# Patient Record
Sex: MALE | Race: WHITE | URBAN - METROPOLITAN AREA
[De-identification: names, ages, dates, MRNs, and addresses within clinical notes are randomized per-mention and may not be internally consistent; named-entity substitution may affect disease eponyms.]

---

## 2017-10-18 ENCOUNTER — APPOINTMENT (OUTPATIENT)
Age: 62
Setting detail: DERMATOLOGY
End: 2017-10-25

## 2017-10-18 VITALS
WEIGHT: 180 LBS | DIASTOLIC BLOOD PRESSURE: 72 MMHG | HEART RATE: 78 BPM | SYSTOLIC BLOOD PRESSURE: 121 MMHG | HEIGHT: 72 IN

## 2017-10-18 DIAGNOSIS — D69.2 OTHER NONTHROMBOCYTOPENIC PURPURA: ICD-10-CM

## 2017-10-18 DIAGNOSIS — L57.0 ACTINIC KERATOSIS: ICD-10-CM

## 2017-10-18 DIAGNOSIS — L81.0 POSTINFLAMMATORY HYPERPIGMENTATION: ICD-10-CM

## 2017-10-18 PROCEDURE — OTHER REASSURANCE: OTHER

## 2017-10-18 PROCEDURE — 99213 OFFICE O/P EST LOW 20 MIN: CPT | Mod: 25

## 2017-10-18 PROCEDURE — OTHER COUNSELING: OTHER

## 2017-10-18 PROCEDURE — 17000 DESTRUCT PREMALG LESION: CPT

## 2017-10-18 PROCEDURE — OTHER MIPS QUALITY: OTHER

## 2017-10-18 PROCEDURE — OTHER LIQUID NITROGEN: OTHER

## 2017-10-18 ASSESSMENT — TOTAL NUMBER OF LESIONS: # OF LESIONS?: 1

## 2017-10-18 ASSESSMENT — LOCATION DETAILED DESCRIPTION DERM
LOCATION DETAILED: LEFT SUPERIOR UPPER BACK
LOCATION DETAILED: RIGHT PROXIMAL DORSAL FOREARM
LOCATION DETAILED: RIGHT SUPERIOR UPPER BACK
LOCATION DETAILED: LEFT CENTRAL MALAR CHEEK

## 2017-10-18 ASSESSMENT — LOCATION SIMPLE DESCRIPTION DERM
LOCATION SIMPLE: LEFT CHEEK
LOCATION SIMPLE: LEFT UPPER BACK
LOCATION SIMPLE: RIGHT UPPER BACK
LOCATION SIMPLE: RIGHT FOREARM

## 2017-10-18 ASSESSMENT — LOCATION ZONE DERM
LOCATION ZONE: ARM
LOCATION ZONE: FACE
LOCATION ZONE: TRUNK

## 2017-10-18 ASSESSMENT — PAIN INTENSITY VAS: HOW INTENSE IS YOUR PAIN 0 BEING NO PAIN, 10 BEING THE MOST SEVERE PAIN POSSIBLE?: NO PAIN

## 2018-05-15 ENCOUNTER — APPOINTMENT (OUTPATIENT)
Age: 63
Setting detail: DERMATOLOGY
End: 2018-05-18

## 2018-05-15 VITALS
HEART RATE: 79 BPM | WEIGHT: 186 LBS | DIASTOLIC BLOOD PRESSURE: 69 MMHG | HEIGHT: 73 IN | SYSTOLIC BLOOD PRESSURE: 115 MMHG

## 2018-05-15 DIAGNOSIS — L57.0 ACTINIC KERATOSIS: ICD-10-CM

## 2018-05-15 DIAGNOSIS — D69.2 OTHER NONTHROMBOCYTOPENIC PURPURA: ICD-10-CM
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
## 2018-05-15 DIAGNOSIS — D89.811 CHRONIC GRAFT-VERSUS-HOST DISEASE: ICD-10-CM

## 2018-05-15 DIAGNOSIS — D18.0 HEMANGIOMA: ICD-10-CM
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
## 2018-05-15 PROBLEM — D18.01 HEMANGIOMA OF SKIN AND SUBCUTANEOUS TISSUE: Status: ACTIVE | Noted: 2018-05-15

## 2018-05-15 PROCEDURE — OTHER MIPS QUALITY: OTHER

## 2018-05-15 PROCEDURE — 99214 OFFICE O/P EST MOD 30 MIN: CPT | Mod: 25

## 2018-05-15 PROCEDURE — 17000 DESTRUCT PREMALG LESION: CPT

## 2018-05-15 PROCEDURE — OTHER COUNSELING: OTHER

## 2018-05-15 PROCEDURE — OTHER RECOMMENDATIONS: OTHER

## 2018-05-15 PROCEDURE — OTHER REASSURANCE: OTHER

## 2018-05-15 PROCEDURE — OTHER LIQUID NITROGEN: OTHER

## 2018-05-15 ASSESSMENT — LOCATION SIMPLE DESCRIPTION DERM
LOCATION SIMPLE: RIGHT UPPER BACK
LOCATION SIMPLE: LEFT NOSE
LOCATION SIMPLE: RIGHT UPPER ARM
LOCATION SIMPLE: CHEST
LOCATION SIMPLE: LEFT UPPER BACK
LOCATION SIMPLE: LEFT FOREARM
LOCATION SIMPLE: RIGHT FOREARM

## 2018-05-15 ASSESSMENT — LOCATION DETAILED DESCRIPTION DERM
LOCATION DETAILED: RIGHT SUPERIOR UPPER BACK
LOCATION DETAILED: LEFT NASAL SIDEWALL
LOCATION DETAILED: RIGHT DISTAL DORSAL FOREARM
LOCATION DETAILED: LEFT PROXIMAL DORSAL FOREARM
LOCATION DETAILED: RIGHT MID-UPPER BACK
LOCATION DETAILED: RIGHT PROXIMAL POSTERIOR UPPER ARM
LOCATION DETAILED: LEFT SUPERIOR UPPER BACK
LOCATION DETAILED: RIGHT MEDIAL INFERIOR CHEST
LOCATION DETAILED: RIGHT PROXIMAL DORSAL FOREARM

## 2018-05-15 ASSESSMENT — LOCATION ZONE DERM
LOCATION ZONE: NOSE
LOCATION ZONE: TRUNK
LOCATION ZONE: ARM

## 2018-05-15 ASSESSMENT — PAIN INTENSITY VAS: HOW INTENSE IS YOUR PAIN 0 BEING NO PAIN, 10 BEING THE MOST SEVERE PAIN POSSIBLE?: NO PAIN

## 2018-05-15 ASSESSMENT — TOTAL NUMBER OF LESIONS: # OF LESIONS?: 1

## 2018-05-15 ASSESSMENT — BSA RASH: BSA RASH: 5

## 2018-05-15 ASSESSMENT — SEVERITY ASSESSMENT: SEVERITY: MILD

## 2018-05-15 NOTE — PROCEDURE: RECOMMENDATIONS
Recommendation Preamble: The following recommendations were made during the visit:
Recommendations (Free Text): Highly advised to wear sunscreen with SPF 50 when in direct sunlight to reduce risk of potential skin malignancies. Explained to patient that certain medications in his treatment regimen may make him more photosensitive, leading to burns and high risk of skin cancer development.
Detail Level: Generalized

## 2018-11-28 ENCOUNTER — APPOINTMENT (OUTPATIENT)
Age: 63
Setting detail: DERMATOLOGY
End: 2018-12-02

## 2018-11-28 VITALS
HEIGHT: 72.5 IN | WEIGHT: 189 LBS | SYSTOLIC BLOOD PRESSURE: 141 MMHG | HEART RATE: 78 BPM | DIASTOLIC BLOOD PRESSURE: 80 MMHG

## 2018-11-28 DIAGNOSIS — L81.4 OTHER MELANIN HYPERPIGMENTATION: ICD-10-CM

## 2018-11-28 DIAGNOSIS — L81.0 POSTINFLAMMATORY HYPERPIGMENTATION: ICD-10-CM

## 2018-11-28 DIAGNOSIS — D22 MELANOCYTIC NEVI: ICD-10-CM

## 2018-11-28 DIAGNOSIS — D89.811 CHRONIC GRAFT-VERSUS-HOST DISEASE: ICD-10-CM

## 2018-11-28 PROBLEM — D22.5 MELANOCYTIC NEVI OF TRUNK: Status: ACTIVE | Noted: 2018-11-28

## 2018-11-28 PROCEDURE — OTHER REASSURANCE: OTHER

## 2018-11-28 PROCEDURE — OTHER PATIENT SPECIFIC COUNSELING: OTHER

## 2018-11-28 PROCEDURE — 99214 OFFICE O/P EST MOD 30 MIN: CPT

## 2018-11-28 PROCEDURE — OTHER MIPS QUALITY: OTHER

## 2018-11-28 PROCEDURE — OTHER COUNSELING: OTHER

## 2018-11-28 ASSESSMENT — LOCATION DETAILED DESCRIPTION DERM
LOCATION DETAILED: LEFT POSTERIOR SHOULDER
LOCATION DETAILED: PERIUMBILICAL SKIN
LOCATION DETAILED: RIGHT POSTERIOR SHOULDER
LOCATION DETAILED: LEFT MEDIAL INFERIOR CHEST
LOCATION DETAILED: RIGHT SUPERIOR MEDIAL MIDBACK
LOCATION DETAILED: RIGHT MEDIAL UPPER BACK
LOCATION DETAILED: RIGHT MEDIAL SUPERIOR CHEST
LOCATION DETAILED: RIGHT SUPERIOR LATERAL MIDBACK
LOCATION DETAILED: LEFT SUPERIOR UPPER BACK

## 2018-11-28 ASSESSMENT — SEVERITY ASSESSMENT: SEVERITY: MILD

## 2018-11-28 ASSESSMENT — LOCATION ZONE DERM
LOCATION ZONE: ARM
LOCATION ZONE: TRUNK

## 2018-11-28 ASSESSMENT — LOCATION SIMPLE DESCRIPTION DERM
LOCATION SIMPLE: LEFT UPPER BACK
LOCATION SIMPLE: RIGHT LOWER BACK
LOCATION SIMPLE: RIGHT SHOULDER
LOCATION SIMPLE: RIGHT UPPER BACK
LOCATION SIMPLE: LEFT SHOULDER
LOCATION SIMPLE: ABDOMEN
LOCATION SIMPLE: CHEST

## 2018-11-28 ASSESSMENT — PAIN INTENSITY VAS: HOW INTENSE IS YOUR PAIN 0 BEING NO PAIN, 10 BEING THE MOST SEVERE PAIN POSSIBLE?: NO PAIN

## 2018-11-28 NOTE — PROCEDURE: PATIENT SPECIFIC COUNSELING
Detail Level: Simple
Reassured the patient that condition is not currently active with residual hyper pigmentation.

## 2019-10-01 NOTE — PROCEDURE: LIQUID NITROGEN
Number Of Freeze-Thaw Cycles: 2 freeze-thaw cycles
Render Post-Care Instructions In Note?: no
Post-Care Instructions: I reviewed with the patient in detail post-care instructions. Patient is to wear sunprotection, and avoid picking at any of the treated lesions. Pt may apply Vaseline to crusted or scabbing areas.
Detail Level: Detailed
Duration Of Freeze Thaw-Cycle (Seconds): 5
Consent: The patient's consent was obtained including but not limited to risks of crusting, scabbing, blistering, scarring, darker or lighter pigmentary change, recurrence, incomplete removal and infection.
n/a

## 2020-06-30 ENCOUNTER — APPOINTMENT (OUTPATIENT)
Dept: URBAN - METROPOLITAN AREA CLINIC 232 | Age: 65
Setting detail: DERMATOLOGY
End: 2020-06-30

## 2020-06-30 DIAGNOSIS — L81.4 OTHER MELANIN HYPERPIGMENTATION: ICD-10-CM

## 2020-06-30 DIAGNOSIS — L57.0 ACTINIC KERATOSIS: ICD-10-CM

## 2020-06-30 DIAGNOSIS — L82.1 OTHER SEBORRHEIC KERATOSIS: ICD-10-CM

## 2020-06-30 DIAGNOSIS — D18.0 HEMANGIOMA: ICD-10-CM

## 2020-06-30 DIAGNOSIS — D22 MELANOCYTIC NEVI: ICD-10-CM

## 2020-06-30 PROBLEM — D22.5 MELANOCYTIC NEVI OF TRUNK: Status: ACTIVE | Noted: 2020-06-30

## 2020-06-30 PROBLEM — D18.01 HEMANGIOMA OF SKIN AND SUBCUTANEOUS TISSUE: Status: ACTIVE | Noted: 2020-06-30

## 2020-06-30 PROCEDURE — 17000 DESTRUCT PREMALG LESION: CPT

## 2020-06-30 PROCEDURE — 99213 OFFICE O/P EST LOW 20 MIN: CPT | Mod: 25

## 2020-06-30 PROCEDURE — 17003 DESTRUCT PREMALG LES 2-14: CPT

## 2020-06-30 PROCEDURE — OTHER LIQUID NITROGEN: OTHER

## 2020-06-30 PROCEDURE — OTHER COUNSELING: OTHER

## 2020-06-30 ASSESSMENT — LOCATION SIMPLE DESCRIPTION DERM
LOCATION SIMPLE: LEFT CHEEK
LOCATION SIMPLE: RIGHT FOREARM
LOCATION SIMPLE: RIGHT UPPER BACK
LOCATION SIMPLE: SCALP

## 2020-06-30 ASSESSMENT — LOCATION ZONE DERM
LOCATION ZONE: ARM
LOCATION ZONE: TRUNK
LOCATION ZONE: FACE
LOCATION ZONE: SCALP

## 2020-06-30 ASSESSMENT — LOCATION DETAILED DESCRIPTION DERM
LOCATION DETAILED: RIGHT MID-UPPER BACK
LOCATION DETAILED: RIGHT SUPERIOR FRONTAL SCALP
LOCATION DETAILED: RIGHT LATERAL UPPER BACK
LOCATION DETAILED: LEFT CENTRAL MALAR CHEEK
LOCATION DETAILED: RIGHT SUPERIOR LATERAL UPPER BACK
LOCATION DETAILED: RIGHT PROXIMAL ULNAR DORSAL FOREARM

## 2020-06-30 NOTE — PROCEDURE: LIQUID NITROGEN
Aperture Size (Optional): C
Render Note In Bullet Format When Appropriate: No
Post-Care Instructions: I reviewed with the patient in detail post-care instructions. Patient is to wear sunprotection, and avoid picking at any of the treated lesions. Pt may apply Vaseline to crusted or scabbing areas.
Detail Level: Detailed
Number Of Freeze-Thaw Cycles: 2 freeze-thaw cycles
Duration Of Freeze Thaw-Cycle (Seconds): 5
Consent: The patient's consent was obtained including but not limited to risks of crusting, scabbing, blistering, scarring, darker or lighter pigmentary change, recurrence, incomplete removal and infection.
posterior

## 2020-11-25 ENCOUNTER — APPOINTMENT (OUTPATIENT)
Dept: URBAN - METROPOLITAN AREA CLINIC 232 | Age: 65
Setting detail: DERMATOLOGY
End: 2020-11-25

## 2020-11-25 PROBLEM — D48.5 NEOPLASM OF UNCERTAIN BEHAVIOR OF SKIN: Status: ACTIVE | Noted: 2020-11-25

## 2020-11-25 PROCEDURE — 11102 TANGNTL BX SKIN SINGLE LES: CPT

## 2020-11-25 PROCEDURE — OTHER BIOPSY BY SHAVE METHOD: OTHER

## 2020-11-25 NOTE — PROCEDURE: BIOPSY BY SHAVE METHOD
Anesthesia Type: 1% lidocaine with epinephrine and a 1:10 solution of 8.4% sodium bicarbonate
Consent: Written consent was obtained and risks were reviewed including but not limited to scarring, infection, bleeding, scabbing, incomplete removal, nerve damage and allergy to anesthesia.
Curettage Text: The wound bed was treated with curettage after the biopsy was performed.
Post-Care Instructions: I reviewed with the patient in detail post-care instructions. Patient is to keep the biopsy site dry overnight, and then apply vaseline or aquaphor healing ointment twice daily until healed. Patient may apply hydrogen peroxide soaks to remove any crusting.
Anesthesia Volume In Cc (Will Not Render If 0): 0.5
Validate Lesion Size: No
Dressing: bandage
Detail Level: Detailed
Electrodesiccation Text: The wound bed was treated with electrodesiccation after the biopsy was performed.
Type Of Destruction Used: Curettage
Additional Anesthesia Volume In Cc (Will Not Render If 0): 0
Electrodesiccation And Curettage Text: The wound bed was treated with electrodesiccation and curettage after the biopsy was performed.
Notification Instructions: Patient will be notified of biopsy results. However, patient instructed to call the office if not contacted within 2 weeks.
Depth Of Biopsy: dermis
Wound Care: Petrolatum
Billing Type: Third-Party Bill
Silver Nitrate Text: The wound bed was treated with silver nitrate after the biopsy was performed.
Hemostasis: Electrocautery
Was A Bandage Applied: Yes
Biopsy Type: H and E
Biopsy Method: Dermablade
Information: Selecting Yes will display possible errors in your note based on the variables you have selected. This validation is only offered as a suggestion for you. PLEASE NOTE THAT THE VALIDATION TEXT WILL BE REMOVED WHEN YOU FINALIZE YOUR NOTE. IF YOU WANT TO FAX A PRELIMINARY NOTE YOU WILL NEED TO TOGGLE THIS TO 'NO' IF YOU DO NOT WANT IT IN YOUR FAXED NOTE.
Cryotherapy Text: The wound bed was treated with cryotherapy after the biopsy was performed.

## 2021-01-19 ENCOUNTER — APPOINTMENT (OUTPATIENT)
Dept: URBAN - METROPOLITAN AREA CLINIC 232 | Age: 66
Setting detail: DERMATOLOGY
End: 2021-01-19

## 2021-01-19 PROBLEM — C44.311 BASAL CELL CARCINOMA OF SKIN OF NOSE: Status: ACTIVE | Noted: 2021-01-19

## 2021-01-19 PROCEDURE — OTHER COUNSELING: OTHER

## 2021-01-19 PROCEDURE — OTHER TREATMENT REGIMEN: OTHER

## 2021-01-19 PROCEDURE — 99213 OFFICE O/P EST LOW 20 MIN: CPT

## 2021-01-19 NOTE — PROCEDURE: TREATMENT REGIMEN
Detail Level: Zone
Initiate Treatment: Patient was explained his options for MOHS surgery and radiation treatment, he has chosen to do radiation. Was given information on the radiation treatment.  All the risks and benefits were discussed (potentially decreased cure rate or reoccurrence) and questions answered.  He really does not want surgery on his nose.

## 2021-02-08 ENCOUNTER — APPOINTMENT (OUTPATIENT)
Dept: URBAN - METROPOLITAN AREA CLINIC 289 | Age: 66
Setting detail: DERMATOLOGY
End: 2021-02-08

## 2021-02-08 PROBLEM — C44.311 BASAL CELL CARCINOMA OF SKIN OF NOSE: Status: ACTIVE | Noted: 2021-02-08

## 2021-02-08 PROCEDURE — 77280 THER RAD SIMULAJ FIELD SMPL: CPT

## 2021-02-08 PROCEDURE — OTHER SUPERFICIAL RADIATION TREATMENT: OTHER

## 2021-02-08 PROCEDURE — 77300 RADIATION THERAPY DOSE PLAN: CPT

## 2021-02-08 PROCEDURE — 99213 OFFICE O/P EST LOW 20 MIN: CPT | Mod: 25

## 2021-02-08 PROCEDURE — OTHER TREATMENT REGIMEN: OTHER

## 2021-02-08 PROCEDURE — G6001 ECHO GUIDANCE RADIOTHERAPY: HCPCS

## 2021-02-08 PROCEDURE — OTHER FOLLOW UP FOR NEXT VISIT: OTHER

## 2021-02-08 PROCEDURE — 77334 RADIATION TREATMENT AID(S): CPT

## 2021-02-08 PROCEDURE — 77261 THER RADIOLOGY TX PLNG SMPL: CPT

## 2021-02-08 NOTE — PROCEDURE: SUPERFICIAL RADIATION TREATMENT
Render Patient Eligibility And Selection In Note?: Yes
Prescription Used: 1
Custom Shielding Afterword Text Will Not Be Included With Simple Simulations (X X Y Cm............): port to correlate with the lesion size, including treatment margin. The custom lead shield is adequate to accommodate the appropriate applicator and provide adequate shielding around the treatment site. Additional shielding (as noted below) is used to protect sensitive, normal tissues.
Include Rx 3 When Rendering Additional Prescriptions: No
Total Dose (Optional-Please Include Units): 5555.40 cGy
Computed Treatment Time In Min (Will Render The Same As Calculated Treatment Time If Left Blank): 0.47
Additional Prescription Justification Text: If there is any interruption in treatment exceeding 5 days please see Decay and Dose Adjustment Calculation and complete treatment under Prescription 2, 3 or 4 as appropriate.
Assessment: Appropriate reaction
Total Dose (Optional-Please Include Units) Rx 2: 2742.60 cGy
Energy (Include Units): 100 kV
Field Size (Applicator): 2.0 cm
Dimensions-X Axis In Cm: 0.8
Fractionation Number: 0
Total Number Of Fractions Rx 4: 15
Patient Positioning: Supine
Time Dose Fractionation (Optional- Include Units If Applicable): 99
Fractions / Week Rx 2: 3
Total Number Of Fractions Rx 2: 10
Functional Status: 0 (fully active)
Time Dose Fractionation (Optional- Include Units If Applicable) Rx 2: 49
Energy (Optional-Please Include Units) Rx 2: 70 kV
Initial Radiation Treatment Planning (Will Render If Bill Simulation = Yes): The patient had a complete consultation regarding all applicable modalities for the treatment of their skin cancer and based on a variety of factors including the type of tumor, size, and location, the relevant medical history as well as local tissue factors, the functional status of the individual, the ability to perform necessary postoperative wound instructions and the need for simultaneous treatments as well as overall wound healing status, it was determined that the patient would begin radiation therapy treatment for skin cancer.  A full simulation and treatment device design was performed including the determination and formulation of appropriate simple and complex devices including lead shield of 0.762 mm thickness to form molded customized shielding to specifically correlate with the lesion size including treatment margin.  The custom lead shield is adequate to accommodate the appropriate applicator and provide adequate shielding around the treatment site.  The specific field applicator, shields, and devices both simple and complex as well as the specific patient setup is outlined below.  The patient was given a full consent for superficial radiation to both verbally and in writing and the full determination of patient's eligibility for treatment and selection is outlined on the patient eligibility and treatment selection form.  The specific superficial radiotherapy prescription was determined and was documented on the superficial radiotherapy prescription form.  A treatment calculation was also performed and documented on the treatment calculation form.  Based on the prescription, the patient was scheduled for a series of fractional treatments.
Custom Shielding Preamble Text Will Not Be Included With Simple Simulations (.......... X X Y Cm): A lead shield of 0.762 mm thickness is utilized to form a molded, custom shield with a
Dose Per Fractionation In Cgy (Optional): 277.77
Depth (Optional-Please Include Units): 2.02 mm
Total Number Of Fractions: 20
Simple Simulation Preamble Text Will Be Included With Simple Simulations (.......... Indications): Simple simulation was performed today for the following reasons:
Daily Fractionated Dose (Optional- Include Units) Rx 2: 274.26 cGy
Treatment Margins In Cm: 0.6
Shielding Size (Optional- Include Units) Rx 2: 2.2 x 3.3 cm
Intro Statement (Will Not Render If Left Blank): The patient has undergone superficial radiation therapy for skin cancer and presents for post SRT evaluation and management. Per protocol and as documented on the flow sheet, the patient was questioned as to subjective redness, pruritus, pain, drainage, fatigue, or any other symptoms. Objectively, the radiation area was evaluated with regards to erythema, atrophy, scale, crusting, erosion, ulceration, edema, purpura, tenderness, warmth, drainage, and any other findings. The plan was extensively reviewed including the dose, and dosing schedule. The simulation and clinical setup was also reviewed as was the external and any internal shields and based on this review the appropriateness and sufficiency of treatment was determined.
Treatment Time / Fractionation (Optional- Include Units): 0.47 min
Treatment Device Design After Initial Simulation Justification (Will Render If Bill For Treatment Devices = Yes): The patient is status post radiation simulation and is evaluated as to the use of additional devices for shielding and placement for radiation therapy.
Fractionation Number (Evaluation): 21
Detail Level: Detailed
Information: Selecting Yes will display possible errors in your note based on the variables you have selected. This validation is only offered as a suggestion for you. PLEASE NOTE THAT THE VALIDATION TEXT WILL BE REMOVED WHEN YOU FINALIZE YOUR NOTE. IF YOU WANT TO FAX A PRELIMINARY NOTE YOU WILL NEED TO TOGGLE THIS TO 'NO' IF YOU DO NOT WANT IT IN YOUR FAXED NOTE.
Treatment Time / Fractionation (Optional- Include Units) Rx 2: 0.42 min
Fractions / Week Rx 4: 5
Dimensions-Y Axis In Cm: 0.7
Treatment Time In Min (Optional): 0.94
Daily Fractionated Dose (Optional- Include Units): 277.77 cGy
Dose / Tx In Cgy (Optional): 602.54
Field Size (Applicator) Rx 2: 4.0 cm

## 2021-02-08 NOTE — PROCEDURE: TREATMENT REGIMEN
Samples Given: Miaderm & Aquaphor
Detail Level: Detailed
Initiate Treatment: SRT
Plan: Per the request of Dr. Cano, patient, Silverio Campo, was seen today for Superficial Radiation Therapy requiring simulation in preparation for treatment of specific diseased site(s). Simulation is necessary to determine correct patient and treatment portal positioning, deliver safe and effective radiation therapy. A high frequency ultrasound image was acquired today for two-dimensional evaluation of the tumor volume, in addition to geometric accuracy of field placement. US depth is 2.02 mm. Physician evaluation of the ultrasound tumor depth will be ongoing throughout the course of treatment and is deemed medically necessary in order to ensure the efficacy of treatment. Additionally, the use of visualization and targeted assessment allows the patient to be able to see their cancer(s) progress, encouraging patient to complete full course of radiotherapy.\\nPer the request of Dr. Cano, continuing medical physics review as per radiotherapy standard of care post every 5th fraction for patient, including assessment of treatment parameters,  of dose delivery, and review of patient treatment documentation in support of the provider, is ordered, ensuring efficacy and continued safe delivery of radiotherapy. Included in physics check is review of patient setup information, all pertinent simulation and treatment photographs checks, prescription, dose calculation verification, daily dose charted correctly, elapsed days and treatment days correctly charted, cumulative dose correct, and review of any prescription changes. Continued medical physics review post every 5th fraction of therapy is requested by provider for appropriate radiotherapy management, and is deemed medically necessary and standard of care.

## 2021-02-09 ENCOUNTER — APPOINTMENT (OUTPATIENT)
Dept: URBAN - METROPOLITAN AREA CLINIC 289 | Age: 66
Setting detail: DERMATOLOGY
End: 2021-02-09

## 2021-02-09 PROBLEM — C44.311 BASAL CELL CARCINOMA OF SKIN OF NOSE: Status: ACTIVE | Noted: 2021-02-09

## 2021-02-09 PROCEDURE — 77401 RADIATION TX DELIVERY SUPFC: CPT

## 2021-02-09 PROCEDURE — G6001 ECHO GUIDANCE RADIOTHERAPY: HCPCS

## 2021-02-09 PROCEDURE — 77280 THER RAD SIMULAJ FIELD SMPL: CPT

## 2021-02-09 PROCEDURE — OTHER FOLLOW UP FOR NEXT VISIT: OTHER

## 2021-02-09 PROCEDURE — OTHER TREATMENT REGIMEN: OTHER

## 2021-02-09 PROCEDURE — OTHER SUPERFICIAL RADIATION TREATMENT: OTHER

## 2021-02-09 NOTE — PROCEDURE: TREATMENT REGIMEN
Detail Level: Detailed
Plan: This patient has been treated today with image guided superficial radiation therapy for non-melanoma skin cancer.\\n\\nWritten informed consent has been previously obtained from this patient for this treatment. This consent is documented in the patient’s chart. The patient gave verbal consent to continue treatment today.\\n\\nThe patient was treated with a specific radiation dose and setup as prescribed by the provider listed on this visit note. A Radiation Therapist performed administration of radiation under supervision of provider. The treatment parameters and cumulative dose are indicated above.\\n\\nPrior to administering the radiation, the patient underwent a verification therapeutic radiology simulation-aided field setting defining relevant normal and abnormal target anatomy and acquiring images with high frequency ultrasound in addition to data necessary developing optimal radiation treatment process for the patient. This process includes verification of the treatment port(s) and proper treatment positioning. All treatment ports were photographed within electronic medical record. The patient’s customized lead blocking along with gross tumor volume and margin was confirmed. Considering superficial radiotherapy is clinical in setup, this requires physician and radiation therapist to clarify location interest being treated against initial images, pathology and patient anatomy. Care was taken ensuring fields treated were geometrically accurate and properly positioned using therapeutic radiology simulation-aided field setting verification per fraction. This process is also utilized to determine if any prescription or setup changes are necessary. These steps are therefore medically necessary ensuring safe and effective administration of radiation. Ongoing therapeutic radiology simulation-aided field setting verification is ordered throughout course of therapy.\\n\\nA high frequency ultrasound image was acquired today for two-dimensional evaluation of the tumor volume and response to treatment, in addition to geometric accuracy of field placement. US depth is 2.03 mm, which is 0.01 mm in difference from previous imaging. The field placement and ultrasound imaging, per fraction, is separate and distinct from the initial simulation, and is an important task in providing safe administration of superficial radiation therapy. Physician evaluation of the ultrasound tumor depth will be ongoing throughout the course of treatment and is deemed medically necessary in order to ensure the efficacy of treatment and any necessary changes. Today’s image was evaluated for determination of continuation of treatment with the current plan or with necessary changes as appropriate. According to provider review of verification, therapeutic radiology simulation-aided field setting and imaging, no change is required. Additionally the use of ultrasound visualization and targeted assessment allows the patient to be able to see their cancer(s) progress, encouraging patient to complete and maintain compliance through full course of radiotherapy.\\n\\nPer Dr. Cano, continued ultrasound guidance and therapeutic radiology simulation-aided field setting verification per fraction is required for field placement, measurement of tumor depth, progress and acute effect monitoring.

## 2021-02-09 NOTE — PROCEDURE: SUPERFICIAL RADIATION TREATMENT
Render Prescriptions In Note?: No
Dimensions-Y Axis In Cm: 0.7
Energy (Optional-Please Include Units): 100 kV
Number Of Days Off Treatment: 1
Simple Simulation Preamble Text Will Be Included With Simple Simulations (.......... Indications): Simple simulation was performed today for the following reasons:
Information: Selecting Yes will display possible errors in your note based on the variables you have selected. This validation is only offered as a suggestion for you. PLEASE NOTE THAT THE VALIDATION TEXT WILL BE REMOVED WHEN YOU FINALIZE YOUR NOTE. IF YOU WANT TO FAX A PRELIMINARY NOTE YOU WILL NEED TO TOGGLE THIS TO 'NO' IF YOU DO NOT WANT IT IN YOUR FAXED NOTE.
Total Number Of Fractions Rx 4: 15
Time Dose Fractionation (Optional- Include Units If Applicable) Rx 2: 49
Total Dose (Optional-Please Include Units): 5555.40 cGy
Daily Fractionated Dose (Optional- Include Units) Rx 2: 274.26 cGy
Field Size (Applicator): 2.0 cm
Depth (Optional-Please Include Units): 2.02 mm
Simple Simulation Afterword Text Will Be Included With Simple Simulations (Indications............): The patient had a complete consultation regarding all applicable modalities for the treatment of their skin cancer and based on a variety of factors including the type of tumor, size, and location, the relevant medical history as well as local tissue factors, the functional status of the individual, the ability to perform necessary postoperative wound instructions and the need for simultaneous treatments as well as overall wound healing status, it was determined that the patient would begin radiation therapy treatment for skin cancer.  A full simulation and treatment device design was performed including the determination and formulation of appropriate simple and complex devices including lead shield of 0.762 mm thickness to form molded customized shielding to specifically correlate with the lesion size including treatment margin.  The custom lead shield is adequate to accommodate the appropriate applicator and provide adequate shielding around the treatment site.  The specific field applicator, shields, and devices both simple and complex as well as the specific patient setup is outlined below.  The patient was given a full consent for superficial radiation to both verbally and in writing and the full determination of patient's eligibility for treatment and selection is outlined on the patient eligibility and treatment selection form.  The specific superficial radiotherapy prescription was determined and was documented on the superficial radiotherapy prescription form.  A treatment calculation was also performed and documented on the treatment calculation form.  Based on the prescription, the patient was scheduled for a series of fractional treatments.
Total Number Of Fractions: 20
Custom Shielding Preamble Text Will Not Be Included With Simple Simulations (.......... X X Y Cm): A lead shield of 0.762 mm thickness is utilized to form a molded, custom shield with a
Bill For Radiation Treatment: Yes
Assessment: Appropriate reaction
Computed Treatment Time In Min (Will Render The Same As Calculated Treatment Time If Left Blank): 0.47
Daily Fractionated Dose (Optional- Include Units): 277.77 cGy
Energy (Optional-Please Include Units) Rx 2: 70 kV
Treatment Device Design After Initial Simulation Justification (Will Render If Bill For Treatment Devices = Yes): The patient is status post radiation simulation and is evaluated as to the use of additional devices for shielding and placement for radiation therapy.
Dose Per Fractionation In Cgy (Optional): 277.77
Treatment Margins In Cm: 0.6
Detail Level: Detailed
Custom Shielding Afterword Text Will Not Be Included With Simple Simulations (X X Y Cm............): port to correlate with the lesion size, including treatment margin. The custom lead shield is adequate to accommodate the appropriate applicator and provide adequate shielding around the treatment site. Additional shielding (as noted below) is used to protect sensitive, normal tissues.
Port Dimensions-X Axis In Cm: 0
Total Dose (Optional-Please Include Units) Rx 2: 2742.60 cGy
Treatment Time / Fractionation (Optional- Include Units): 0.47 min
Time Dose Fractionation (Optional- Include Units If Applicable): 99
Total Number Of Fractions Rx 2: 10
Functional Status: 0 (fully active)
Dimensions-X Axis In Cm: 0.8
Fractions / Week Rx 3: 5
Fractions / Week Rx 2: 3
Patient Positioning: Supine
Day Of The Week Treatment Administered: Tuesday
Intro Statement (Will Not Render If Left Blank): The patient has undergone superficial radiation therapy for skin cancer and presents for post SRT evaluation and management. Per protocol and as documented on the flow sheet, the patient was questioned as to subjective redness, pruritus, pain, drainage, fatigue, or any other symptoms. Objectively, the radiation area was evaluated with regards to erythema, atrophy, scale, crusting, erosion, ulceration, edema, purpura, tenderness, warmth, drainage, and any other findings. The plan was extensively reviewed including the dose, and dosing schedule. The simulation and clinical setup was also reviewed as was the external and any internal shields and based on this review the appropriateness and sufficiency of treatment was determined.
Additional Prescription Justification Text: If there is any interruption in treatment exceeding 5 days please see Decay and Dose Adjustment Calculation and complete treatment under Prescription 2, 3 or 4 as appropriate.
Treatment Time / Fractionation (Optional- Include Units) Rx 2: 0.42 min
Field Size (Applicator) Rx 2: 4.0 cm
Fractionation Number (Evaluation): 21
Shielding Size (Optional- Include Units) Rx 2: 2.2 x 3.3 cm

## 2021-02-10 ENCOUNTER — APPOINTMENT (OUTPATIENT)
Dept: URBAN - METROPOLITAN AREA CLINIC 289 | Age: 66
Setting detail: DERMATOLOGY
End: 2021-02-10

## 2021-02-10 PROBLEM — C44.311 BASAL CELL CARCINOMA OF SKIN OF NOSE: Status: ACTIVE | Noted: 2021-02-10

## 2021-02-10 PROCEDURE — OTHER TREATMENT REGIMEN: OTHER

## 2021-02-10 PROCEDURE — G6001 ECHO GUIDANCE RADIOTHERAPY: HCPCS

## 2021-02-10 PROCEDURE — OTHER SUPERFICIAL RADIATION TREATMENT: OTHER

## 2021-02-10 PROCEDURE — 77401 RADIATION TX DELIVERY SUPFC: CPT

## 2021-02-10 PROCEDURE — OTHER FOLLOW UP FOR NEXT VISIT: OTHER

## 2021-02-10 PROCEDURE — 77280 THER RAD SIMULAJ FIELD SMPL: CPT

## 2021-02-10 NOTE — PROCEDURE: TREATMENT REGIMEN

## 2021-02-10 NOTE — PROCEDURE: SUPERFICIAL RADIATION TREATMENT
Total Number Of Fractions Rx 3: 15
Custom Shielding Afterword Text Will Not Be Included With Simple Simulations (X X Y Cm............): port to correlate with the lesion size, including treatment margin. The custom lead shield is adequate to accommodate the appropriate applicator and provide adequate shielding around the treatment site. Additional shielding (as noted below) is used to protect sensitive, normal tissues.
Assessment: Appropriate reaction
Total Dose (Optional-Please Include Units): 5555.40 cGy
Fractions / Week Rx 4: 5
Field Size (Applicator): 2.0 cm
Agustin For Simulation Without Treatment Device Design (Simple Simulation): No
Field Number: 1
Treatment Time / Fractionation (Optional- Include Units) Rx 2: 0.42 min
Fractions / Week: 3
Fractionation Number (Evaluation): 21
Treatment Time In Min (Optional): 0.47
Treatment Time / Fractionation (Optional- Include Units): 0.47 min
Fractionation Number: 2
Energy (Optional-Please Include Units): 100 kV
Bill For Radiation Treatment: Yes
Simple Simulation Afterword Text Will Be Included With Simple Simulations (Indications............): The patient had a complete consultation regarding all applicable modalities for the treatment of their skin cancer and based on a variety of factors including the type of tumor, size, and location, the relevant medical history as well as local tissue factors, the functional status of the individual, the ability to perform necessary postoperative wound instructions and the need for simultaneous treatments as well as overall wound healing status, it was determined that the patient would begin radiation therapy treatment for skin cancer.  A full simulation and treatment device design was performed including the determination and formulation of appropriate simple and complex devices including lead shield of 0.762 mm thickness to form molded customized shielding to specifically correlate with the lesion size including treatment margin.  The custom lead shield is adequate to accommodate the appropriate applicator and provide adequate shielding around the treatment site.  The specific field applicator, shields, and devices both simple and complex as well as the specific patient setup is outlined below.  The patient was given a full consent for superficial radiation to both verbally and in writing and the full determination of patient's eligibility for treatment and selection is outlined on the patient eligibility and treatment selection form.  The specific superficial radiotherapy prescription was determined and was documented on the superficial radiotherapy prescription form.  A treatment calculation was also performed and documented on the treatment calculation form.  Based on the prescription, the patient was scheduled for a series of fractional treatments.
Patient Positioning: Supine
Day Of The Week Treatment Administered: Wednesday
Dimensions-X Axis In Cm: 0.8
Intro Statement (Will Not Render If Left Blank): The patient has undergone superficial radiation therapy for skin cancer and presents for post SRT evaluation and management. Per protocol and as documented on the flow sheet, the patient was questioned as to subjective redness, pruritus, pain, drainage, fatigue, or any other symptoms. Objectively, the radiation area was evaluated with regards to erythema, atrophy, scale, crusting, erosion, ulceration, edema, purpura, tenderness, warmth, drainage, and any other findings. The plan was extensively reviewed including the dose, and dosing schedule. The simulation and clinical setup was also reviewed as was the external and any internal shields and based on this review the appropriateness and sufficiency of treatment was determined.
Field Size (Applicator) Rx 2: 4.0 cm
Port Dimensions-X Axis In Cm: 0
Information: Selecting Yes will display possible errors in your note based on the variables you have selected. This validation is only offered as a suggestion for you. PLEASE NOTE THAT THE VALIDATION TEXT WILL BE REMOVED WHEN YOU FINALIZE YOUR NOTE. IF YOU WANT TO FAX A PRELIMINARY NOTE YOU WILL NEED TO TOGGLE THIS TO 'NO' IF YOU DO NOT WANT IT IN YOUR FAXED NOTE.
Custom Shielding Preamble Text Will Not Be Included With Simple Simulations (.......... X X Y Cm): A lead shield of 0.762 mm thickness is utilized to form a molded, custom shield with a
Total Number Of Fractions Rx 2: 10
Shielding Size (Optional- Include Units) Rx 2: 2.2 x 3.3 cm
Time Dose Fractionation (Optional- Include Units If Applicable): 99
Detail Level: Detailed
Dose / Tx In Cgy (Optional): 277.77
Total Number Of Fractions: 20
Dimensions-Y Axis In Cm: 0.7
Treatment Device Design After Initial Simulation Justification (Will Render If Bill For Treatment Devices = Yes): The patient is status post radiation simulation and is evaluated as to the use of additional devices for shielding and placement for radiation therapy.
Depth (Optional-Please Include Units): 2.02 mm
Time Dose Fractionation (Optional- Include Units If Applicable) Rx 2: 49
Total Dose (Optional-Please Include Units) Rx 2: 2742.60 cGy
Daily Fractionated Dose (Optional- Include Units): 277.77 cGy
Cumulative Dose In Cgy (Optional): 555.54
Simple Simulation Preamble Text Will Be Included With Simple Simulations (.......... Indications): Simple simulation was performed today for the following reasons:
Treatment Margins In Cm: 0.6
Additional Prescription Justification Text: If there is any interruption in treatment exceeding 5 days please see Decay and Dose Adjustment Calculation and complete treatment under Prescription 2, 3 or 4 as appropriate.
Energy (Optional-Please Include Units) Rx 2: 70 kV
Daily Fractionated Dose (Optional- Include Units) Rx 2: 274.26 cGy
Functional Status: 0 (fully active)

## 2021-02-11 ENCOUNTER — APPOINTMENT (OUTPATIENT)
Dept: URBAN - METROPOLITAN AREA CLINIC 289 | Age: 66
Setting detail: DERMATOLOGY
End: 2021-02-11

## 2021-02-11 PROBLEM — C44.311 BASAL CELL CARCINOMA OF SKIN OF NOSE: Status: ACTIVE | Noted: 2021-02-11

## 2021-02-11 PROCEDURE — OTHER FOLLOW UP FOR NEXT VISIT: OTHER

## 2021-02-11 PROCEDURE — OTHER SUPERFICIAL RADIATION TREATMENT: OTHER

## 2021-02-11 PROCEDURE — G6001 ECHO GUIDANCE RADIOTHERAPY: HCPCS

## 2021-02-11 PROCEDURE — 77401 RADIATION TX DELIVERY SUPFC: CPT

## 2021-02-11 PROCEDURE — OTHER TREATMENT REGIMEN: OTHER

## 2021-02-11 PROCEDURE — 77280 THER RAD SIMULAJ FIELD SMPL: CPT

## 2021-02-11 NOTE — PROCEDURE: SUPERFICIAL RADIATION TREATMENT
Render Additional Prescriptions In Note?: No
Field Number: 1
Fractions / Week Rx 3: 5
Dose / Tx In Cgy (Optional): 277.77
Total Dose (Optional-Please Include Units) Rx 2: 2742.60 cGy
Time Dose Fractionation (Optional- Include Units If Applicable): 99
Cumulative Dose In Cgy (Optional): 833.31
Field Size (Applicator): 2.0 cm
Initial Radiation Treatment Planning (Will Render If Bill Simulation = Yes): The patient had a complete consultation regarding all applicable modalities for the treatment of their skin cancer and based on a variety of factors including the type of tumor, size, and location, the relevant medical history as well as local tissue factors, the functional status of the individual, the ability to perform necessary postoperative wound instructions and the need for simultaneous treatments as well as overall wound healing status, it was determined that the patient would begin radiation therapy treatment for skin cancer.  A full simulation and treatment device design was performed including the determination and formulation of appropriate simple and complex devices including lead shield of 0.762 mm thickness to form molded customized shielding to specifically correlate with the lesion size including treatment margin.  The custom lead shield is adequate to accommodate the appropriate applicator and provide adequate shielding around the treatment site.  The specific field applicator, shields, and devices both simple and complex as well as the specific patient setup is outlined below.  The patient was given a full consent for superficial radiation to both verbally and in writing and the full determination of patient's eligibility for treatment and selection is outlined on the patient eligibility and treatment selection form.  The specific superficial radiotherapy prescription was determined and was documented on the superficial radiotherapy prescription form.  A treatment calculation was also performed and documented on the treatment calculation form.  Based on the prescription, the patient was scheduled for a series of fractional treatments.
Total Number Of Fractions Rx 3: 15
Dimensions-Y Axis In Cm: 0.7
Patient Positioning: Supine
Port Dimensions-X Axis In Cm: 0
Daily Fractionated Dose (Optional- Include Units) Rx 2: 274.26 cGy
Total Number Of Fractions: 20
Energy (Optional-Please Include Units) Rx 2: 70 kV
Depth (Optional-Please Include Units): 2.02 mm
Validate Note Data (See Information Below): Yes
Dimensions-X Axis In Cm: 0.8
Computed Treatment Time In Min (Will Render The Same As Calculated Treatment Time If Left Blank): 0.47
Treatment Margins In Cm: 0.6
Additional Prescription Justification Text: If there is any interruption in treatment exceeding 5 days please see Decay and Dose Adjustment Calculation and complete treatment under Prescription 2, 3 or 4 as appropriate.
Detail Level: Detailed
Fractionation Number (Evaluation): 21
Assessment: Appropriate reaction
Treatment Time / Fractionation (Optional- Include Units) Rx 2: 0.42 min
Fractionation Number: 3
Field Size (Applicator) Rx 2: 4.0 cm
Daily Fractionated Dose (Optional- Include Units): 277.77 cGy
Simple Simulation Preamble Text Will Be Included With Simple Simulations (.......... Indications): Simple simulation was performed today for the following reasons:
Energy (Include Units): 100 kV
Total Dose (Optional-Please Include Units): 5555.40 cGy
Custom Shielding Preamble Text Will Not Be Included With Simple Simulations (.......... X X Y Cm): A lead shield of 0.762 mm thickness is utilized to form a molded, custom shield with a
Treatment Time / Fractionation (Optional- Include Units): 0.47 min
Information: Selecting Yes will display possible errors in your note based on the variables you have selected. This validation is only offered as a suggestion for you. PLEASE NOTE THAT THE VALIDATION TEXT WILL BE REMOVED WHEN YOU FINALIZE YOUR NOTE. IF YOU WANT TO FAX A PRELIMINARY NOTE YOU WILL NEED TO TOGGLE THIS TO 'NO' IF YOU DO NOT WANT IT IN YOUR FAXED NOTE.
Day Of The Week Treatment Administered: Thursday
Total Number Of Fractions Rx 2: 10
Intro Statement (Will Not Render If Left Blank): The patient has undergone superficial radiation therapy for skin cancer and presents for post SRT evaluation and management. Per protocol and as documented on the flow sheet, the patient was questioned as to subjective redness, pruritus, pain, drainage, fatigue, or any other symptoms. Objectively, the radiation area was evaluated with regards to erythema, atrophy, scale, crusting, erosion, ulceration, edema, purpura, tenderness, warmth, drainage, and any other findings. The plan was extensively reviewed including the dose, and dosing schedule. The simulation and clinical setup was also reviewed as was the external and any internal shields and based on this review the appropriateness and sufficiency of treatment was determined.
Shielding Size (Optional- Include Units) Rx 2: 2.2 x 3.3 cm
Custom Shielding Afterword Text Will Not Be Included With Simple Simulations (X X Y Cm............): port to correlate with the lesion size, including treatment margin. The custom lead shield is adequate to accommodate the appropriate applicator and provide adequate shielding around the treatment site. Additional shielding (as noted below) is used to protect sensitive, normal tissues.
Treatment Device Design After Initial Simulation Justification (Will Render If Bill For Treatment Devices = Yes): The patient is status post radiation simulation and is evaluated as to the use of additional devices for shielding and placement for radiation therapy.
Time Dose Fractionation (Optional- Include Units If Applicable) Rx 2: 49
Functional Status: 0 (fully active)

## 2021-02-11 NOTE — PROCEDURE: TREATMENT REGIMEN
Detail Level: Detailed
Plan: This patient has been treated today with image guided superficial radiation therapy for non-melanoma skin cancer.\\n\\nWritten informed consent has been previously obtained from this patient for this treatment. This consent is documented in the patient’s chart. The patient gave verbal consent to continue treatment today.\\n\\nThe patient was treated with a specific radiation dose and setup as prescribed by the provider listed on this visit note. A Radiation Therapist performed administration of radiation under supervision of provider. The treatment parameters and cumulative dose are indicated above.\\n\\nPrior to administering the radiation, the patient underwent a verification therapeutic radiology simulation-aided field setting defining relevant normal and abnormal target anatomy and acquiring images with high frequency ultrasound in addition to data necessary developing optimal radiation treatment process for the patient. This process includes verification of the treatment port(s) and proper treatment positioning. All treatment ports were photographed within electronic medical record. The patient’s customized lead blocking along with gross tumor volume and margin was confirmed. Considering superficial radiotherapy is clinical in setup, this requires physician and radiation therapist to clarify location interest being treated against initial images, pathology and patient anatomy. Care was taken ensuring fields treated were geometrically accurate and properly positioned using therapeutic radiology simulation-aided field setting verification per fraction. This process is also utilized to determine if any prescription or setup changes are necessary. These steps are therefore medically necessary ensuring safe and effective administration of radiation. Ongoing therapeutic radiology simulation-aided field setting verification is ordered throughout course of therapy.\\n\\nA high frequency ultrasound image was acquired today for two-dimensional evaluation of the tumor volume and response to treatment, in addition to geometric accuracy of field placement. US depth is 1.95 mm, which is 0.04 mm in difference from previous imaging. The field placement and ultrasound imaging, per fraction, is separate and distinct from the initial simulation, and is an important task in providing safe administration of superficial radiation therapy. Physician evaluation of the ultrasound tumor depth will be ongoing throughout the course of treatment and is deemed medically necessary in order to ensure the efficacy of treatment and any necessary changes. Today’s image was evaluated for determination of continuation of treatment with the current plan or with necessary changes as appropriate. According to provider review of verification, therapeutic radiology simulation-aided field setting and imaging, no change is required. Additionally the use of ultrasound visualization and targeted assessment allows the patient to be able to see their cancer(s) progress, encouraging patient to complete and maintain compliance through full course of radiotherapy.\\n\\nPer Dr. Cano, continued ultrasound guidance and therapeutic radiology simulation-aided field setting verification per fraction is required for field placement, measurement of tumor depth, progress and acute effect monitoring.

## 2021-02-15 ENCOUNTER — APPOINTMENT (OUTPATIENT)
Dept: URBAN - METROPOLITAN AREA CLINIC 289 | Age: 66
Setting detail: DERMATOLOGY
End: 2021-02-16

## 2021-02-15 PROBLEM — C44.311 BASAL CELL CARCINOMA OF SKIN OF NOSE: Status: ACTIVE | Noted: 2021-02-15

## 2021-02-15 PROCEDURE — 77401 RADIATION TX DELIVERY SUPFC: CPT

## 2021-02-15 PROCEDURE — OTHER FOLLOW UP FOR NEXT VISIT: OTHER

## 2021-02-15 PROCEDURE — OTHER SUPERFICIAL RADIATION TREATMENT: OTHER

## 2021-02-15 PROCEDURE — OTHER TREATMENT REGIMEN: OTHER

## 2021-02-15 PROCEDURE — G6001 ECHO GUIDANCE RADIOTHERAPY: HCPCS

## 2021-02-15 PROCEDURE — 77280 THER RAD SIMULAJ FIELD SMPL: CPT

## 2021-02-15 NOTE — PROCEDURE: SUPERFICIAL RADIATION TREATMENT
Dose / Tx In Cgy (Optional): 277.77
Simple Simulation Preamble Text Will Be Included With Simple Simulations (.......... Indications): Simple simulation was performed today for the following reasons:
Total Number Of Fractions Rx 2: 10
Total Number Of Fractions: 20
Total Number Of Fractions Rx 3: 15
Depth (Optional-Please Include Units): 2.02 mm
Total Dose (Optional-Please Include Units) Rx 2: 2742.60 cGy
Number Of Days Off Treatment: 1
Energy (Optional-Please Include Units): 100 kV
Dimensions-X Axis In Cm: 0.8
Custom Shielding Afterword Text Will Not Be Included With Simple Simulations (X X Y Cm............): port to correlate with the lesion size, including treatment margin. The custom lead shield is adequate to accommodate the appropriate applicator and provide adequate shielding around the treatment site. Additional shielding (as noted below) is used to protect sensitive, normal tissues.
Information: Selecting Yes will display possible errors in your note based on the variables you have selected. This validation is only offered as a suggestion for you. PLEASE NOTE THAT THE VALIDATION TEXT WILL BE REMOVED WHEN YOU FINALIZE YOUR NOTE. IF YOU WANT TO FAX A PRELIMINARY NOTE YOU WILL NEED TO TOGGLE THIS TO 'NO' IF YOU DO NOT WANT IT IN YOUR FAXED NOTE.
Shielding Size (Optional- Include Units) Rx 2: 2.2 x 3.3 cm
Patient Positioning: Supine
Port Dimensions-Y Axis In Cm: 0
Bill For Dosimetry/Render Treatment Time Calculation In Note: No
Fractions / Week Rx 3: 5
Intro Statement (Will Not Render If Left Blank): The patient has undergone superficial radiation therapy for skin cancer and presents for post SRT evaluation and management. Per protocol and as documented on the flow sheet, the patient was questioned as to subjective redness, pruritus, pain, drainage, fatigue, or any other symptoms. Objectively, the radiation area was evaluated with regards to erythema, atrophy, scale, crusting, erosion, ulceration, edema, purpura, tenderness, warmth, drainage, and any other findings. The plan was extensively reviewed including the dose, and dosing schedule. The simulation and clinical setup was also reviewed as was the external and any internal shields and based on this review the appropriateness and sufficiency of treatment was determined.
Daily Fractionated Dose (Optional- Include Units) Rx 2: 274.26 cGy
Daily Fractionated Dose (Optional- Include Units): 277.77 cGy
Additional Prescription Justification Text: If there is any interruption in treatment exceeding 5 days please see Decay and Dose Adjustment Calculation and complete treatment under Prescription 2, 3 or 4 as appropriate.
Assessment: Appropriate reaction
Dimensions-Y Axis In Cm: 0.7
Simple Simulation Afterword Text Will Be Included With Simple Simulations (Indications............): The patient had a complete consultation regarding all applicable modalities for the treatment of their skin cancer and based on a variety of factors including the type of tumor, size, and location, the relevant medical history as well as local tissue factors, the functional status of the individual, the ability to perform necessary postoperative wound instructions and the need for simultaneous treatments as well as overall wound healing status, it was determined that the patient would begin radiation therapy treatment for skin cancer.  A full simulation and treatment device design was performed including the determination and formulation of appropriate simple and complex devices including lead shield of 0.762 mm thickness to form molded customized shielding to specifically correlate with the lesion size including treatment margin.  The custom lead shield is adequate to accommodate the appropriate applicator and provide adequate shielding around the treatment site.  The specific field applicator, shields, and devices both simple and complex as well as the specific patient setup is outlined below.  The patient was given a full consent for superficial radiation to both verbally and in writing and the full determination of patient's eligibility for treatment and selection is outlined on the patient eligibility and treatment selection form.  The specific superficial radiotherapy prescription was determined and was documented on the superficial radiotherapy prescription form.  A treatment calculation was also performed and documented on the treatment calculation form.  Based on the prescription, the patient was scheduled for a series of fractional treatments.
Computed Treatment Time In Min (Will Render The Same As Calculated Treatment Time If Left Blank): 0.47
Fractions / Week Rx 2: 3
Field Size (Applicator): 2.0 cm
Total Dose (Optional-Please Include Units): 5555.40 cGy
Cumulative Dose In Cgy (Optional): 1111.08
Validate Note Data (See Information Below): Yes
Field Size (Applicator) Rx 2: 4.0 cm
Fractionation Number: 4
Detail Level: Detailed
Energy (Optional-Please Include Units) Rx 2: 70 kV
Functional Status: 0 (fully active)
Treatment Time / Fractionation (Optional- Include Units): 0.47 min
Custom Shielding Preamble Text Will Not Be Included With Simple Simulations (.......... X X Y Cm): A lead shield of 0.762 mm thickness is utilized to form a molded, custom shield with a
Treatment Time / Fractionation (Optional- Include Units) Rx 2: 0.42 min
Treatment Margins In Cm: 0.6
Time Dose Fractionation (Optional- Include Units If Applicable) Rx 2: 49
Fractionation Number (Evaluation): 21
Day Of The Week Treatment Administered: Monday
Treatment Device Design After Initial Simulation Justification (Will Render If Bill For Treatment Devices = Yes): The patient is status post radiation simulation and is evaluated as to the use of additional devices for shielding and placement for radiation therapy.
Time Dose Fractionation (Optional- Include Units If Applicable): 99

## 2021-02-15 NOTE — PROCEDURE: TREATMENT REGIMEN
Detail Level: Detailed
Plan: This patient has been treated today with image guided superficial radiation therapy for non-melanoma skin cancer.\\n\\nWritten informed consent has been previously obtained from this patient for this treatment. This consent is documented in the patient’s chart. The patient gave verbal consent to continue treatment today.\\n\\nThe patient was treated with a specific radiation dose and setup as prescribed by the provider listed on this visit note. A Radiation Therapist performed administration of radiation under supervision of provider. The treatment parameters and cumulative dose are indicated above.\\n\\nPrior to administering the radiation, the patient underwent a verification therapeutic radiology simulation-aided field setting defining relevant normal and abnormal target anatomy and acquiring images with high frequency ultrasound in addition to data necessary developing optimal radiation treatment process for the patient. This process includes verification of the treatment port(s) and proper treatment positioning. All treatment ports were photographed within electronic medical record. The patient’s customized lead blocking along with gross tumor volume and margin was confirmed. Considering superficial radiotherapy is clinical in setup, this requires physician and radiation therapist to clarify location interest being treated against initial images, pathology and patient anatomy. Care was taken ensuring fields treated were geometrically accurate and properly positioned using therapeutic radiology simulation-aided field setting verification per fraction. This process is also utilized to determine if any prescription or setup changes are necessary. These steps are therefore medically necessary ensuring safe and effective administration of radiation. Ongoing therapeutic radiology simulation-aided field setting verification is ordered throughout course of therapy.\\n\\nA high frequency ultrasound image was acquired today for two-dimensional evaluation of the tumor volume and response to treatment, in addition to geometric accuracy of field placement. US depth is 2.16 mm, which is 0.21 mm in difference from previous imaging. The field placement and ultrasound imaging, per fraction, is separate and distinct from the initial simulation, and is an important task in providing safe administration of superficial radiation therapy. Physician evaluation of the ultrasound tumor depth will be ongoing throughout the course of treatment and is deemed medically necessary in order to ensure the efficacy of treatment and any necessary changes. Today’s image was evaluated for determination of continuation of treatment with the current plan or with necessary changes as appropriate. According to provider review of verification, therapeutic radiology simulation-aided field setting and imaging, no change is required. Additionally the use of ultrasound visualization and targeted assessment allows the patient to be able to see their cancer(s) progress, encouraging patient to complete and maintain compliance through full course of radiotherapy.\\n\\nPer Dr. Cano, continued ultrasound guidance and therapeutic radiology simulation-aided field setting verification per fraction is required for field placement, measurement of tumor depth, progress and acute effect monitoring.

## 2021-02-17 ENCOUNTER — APPOINTMENT (OUTPATIENT)
Dept: URBAN - METROPOLITAN AREA CLINIC 289 | Age: 66
Setting detail: DERMATOLOGY
End: 2021-02-17

## 2021-02-17 PROBLEM — C44.311 BASAL CELL CARCINOMA OF SKIN OF NOSE: Status: ACTIVE | Noted: 2021-02-17

## 2021-02-17 PROCEDURE — G6001 ECHO GUIDANCE RADIOTHERAPY: HCPCS

## 2021-02-17 PROCEDURE — 77280 THER RAD SIMULAJ FIELD SMPL: CPT

## 2021-02-17 PROCEDURE — OTHER TREATMENT REGIMEN: OTHER

## 2021-02-17 PROCEDURE — 77427 RADIATION TX MANAGEMENT X5: CPT

## 2021-02-17 PROCEDURE — 77401 RADIATION TX DELIVERY SUPFC: CPT

## 2021-02-17 PROCEDURE — OTHER FOLLOW UP FOR NEXT VISIT: OTHER

## 2021-02-17 PROCEDURE — OTHER SUPERFICIAL RADIATION TREATMENT: OTHER

## 2021-02-17 NOTE — PROCEDURE: TREATMENT REGIMEN

## 2021-02-17 NOTE — PROCEDURE: SUPERFICIAL RADIATION TREATMENT
Assessment: Appropriate reaction
Bill For Radiation Treatment: Yes
Bill For Dosimetry/Render Treatment Time Calculation In Note: No
Field Size (Applicator): 2.0 cm
Fractions / Week Rx 3: 5
Comments: RTOG 0
Custom Shielding Preamble Text Will Not Be Included With Simple Simulations (.......... X X Y Cm): A lead shield of 0.762 mm thickness is utilized to form a molded, custom shield with a
Information: Selecting Yes will display possible errors in your note based on the variables you have selected. This validation is only offered as a suggestion for you. PLEASE NOTE THAT THE VALIDATION TEXT WILL BE REMOVED WHEN YOU FINALIZE YOUR NOTE. IF YOU WANT TO FAX A PRELIMINARY NOTE YOU WILL NEED TO TOGGLE THIS TO 'NO' IF YOU DO NOT WANT IT IN YOUR FAXED NOTE.
Initial Radiation Treatment Planning (Will Render If Bill Simulation = Yes): The patient had a complete consultation regarding all applicable modalities for the treatment of their skin cancer and based on a variety of factors including the type of tumor, size, and location, the relevant medical history as well as local tissue factors, the functional status of the individual, the ability to perform necessary postoperative wound instructions and the need for simultaneous treatments as well as overall wound healing status, it was determined that the patient would begin radiation therapy treatment for skin cancer.  A full simulation and treatment device design was performed including the determination and formulation of appropriate simple and complex devices including lead shield of 0.762 mm thickness to form molded customized shielding to specifically correlate with the lesion size including treatment margin.  The custom lead shield is adequate to accommodate the appropriate applicator and provide adequate shielding around the treatment site.  The specific field applicator, shields, and devices both simple and complex as well as the specific patient setup is outlined below.  The patient was given a full consent for superficial radiation to both verbally and in writing and the full determination of patient's eligibility for treatment and selection is outlined on the patient eligibility and treatment selection form.  The specific superficial radiotherapy prescription was determined and was documented on the superficial radiotherapy prescription form.  A treatment calculation was also performed and documented on the treatment calculation form.  Based on the prescription, the patient was scheduled for a series of fractional treatments.
Day Of The Week Treatment Administered: Wednesday
Number Of Days Off Treatment: 1
Treatment Time / Fractionation (Optional- Include Units) Rx 2: 0.42 min
Depth (Optional-Please Include Units): 2.02 mm
Cumulative Dose In Cgy (Optional): 1388.85
Total Dose (Optional-Please Include Units): 5555.40 cGy
Treatment Time / Fractionation (Optional- Include Units): 0.47 min
Additional Prescription Justification Text: If there is any interruption in treatment exceeding 5 days please see Decay and Dose Adjustment Calculation and complete treatment under Prescription 2, 3 or 4 as appropriate.
Fractions / Week: 3
Port Dimensions-X Axis In Cm: 0
Total Number Of Fractions Rx 3: 15
Energy (Optional-Please Include Units): 100 kV
Simple Simulation Preamble Text Will Be Included With Simple Simulations (.......... Indications): Simple simulation was performed today for the following reasons:
Shielding Size (Optional- Include Units) Rx 2: 2.2 x 3.3 cm
Total Dose (Optional-Please Include Units) Rx 2: 2742.60 cGy
Treatment Time In Min (Optional): 0.47
Time Dose Fractionation (Optional- Include Units If Applicable): 99
Total Number Of Fractions Rx 2: 10
Daily Fractionated Dose (Optional- Include Units) Rx 2: 274.26 cGy
Intro Statement (Will Not Render If Left Blank): The patient is undergoing superficial radiation therapy for skin cancer and presents for weekly evaluation and management. Per protocol and as documented on the flow sheet, the patient was questioned as to subjective redness, pruritus, pain, drainage, fatigue, or any other symptoms. Objectively, the radiation area was evaluated with regards to erythema, atrophy, scale, crusting, erosion, ulceration, edema, purpura, tenderness, warmth, drainage, and any other findings. The plan was extensively reviewed including the dose, and dosing schedule. The simulation and clinical setup was also reviewed as was the external and any internal shields and based on this review the appropriateness and sufficiency of treatment was determined.
Custom Shielding Afterword Text Will Not Be Included With Simple Simulations (X X Y Cm............): port to correlate with the lesion size, including treatment margin. The custom lead shield is adequate to accommodate the appropriate applicator and provide adequate shielding around the treatment site. Additional shielding (as noted below) is used to protect sensitive, normal tissues.
Daily Fractionated Dose (Optional- Include Units): 277.77 cGy
Dimensions-X Axis In Cm: 0.8
Detail Level: Detailed
Total Number Of Fractions: 20
Treatment Margins In Cm: 0.6
Functional Status: 0 (fully active)
Dimensions-Y Axis In Cm: 0.7
Dose / Tx In Cgy (Optional): 277.77
Treatment Device Design After Initial Simulation Justification (Will Render If Bill For Treatment Devices = Yes): The patient is status post radiation simulation and is evaluated as to the use of additional devices for shielding and placement for radiation therapy.
Energy (Optional-Please Include Units) Rx 2: 70 kV
Time Dose Fractionation (Optional- Include Units If Applicable) Rx 2: 49
Field Size (Applicator) Rx 2: 4.0 cm
Patient Positioning: Supine

## 2021-02-18 ENCOUNTER — APPOINTMENT (OUTPATIENT)
Dept: URBAN - METROPOLITAN AREA CLINIC 289 | Age: 66
Setting detail: DERMATOLOGY
End: 2021-02-18

## 2021-02-18 PROBLEM — C44.311 BASAL CELL CARCINOMA OF SKIN OF NOSE: Status: ACTIVE | Noted: 2021-02-18

## 2021-02-18 PROCEDURE — OTHER FOLLOW UP FOR NEXT VISIT: OTHER

## 2021-02-18 PROCEDURE — 77401 RADIATION TX DELIVERY SUPFC: CPT

## 2021-02-18 PROCEDURE — OTHER TREATMENT REGIMEN: OTHER

## 2021-02-18 PROCEDURE — 77280 THER RAD SIMULAJ FIELD SMPL: CPT

## 2021-02-18 PROCEDURE — G6001 ECHO GUIDANCE RADIOTHERAPY: HCPCS

## 2021-02-18 PROCEDURE — OTHER SUPERFICIAL RADIATION TREATMENT: OTHER

## 2021-02-18 NOTE — PROCEDURE: TREATMENT REGIMEN
Detail Level: Detailed
Plan: This patient has been treated today with image guided superficial radiation therapy for non-melanoma skin cancer.\\n\\nWritten informed consent has been previously obtained from this patient for this treatment. This consent is documented in the patient’s chart. The patient gave verbal consent to continue treatment today.\\n\\nThe patient was treated with a specific radiation dose and setup as prescribed by the provider listed on this visit note. A Radiation Therapist performed administration of radiation under supervision of provider. The treatment parameters and cumulative dose are indicated above.\\n\\nPrior to administering the radiation, the patient underwent a verification therapeutic radiology simulation-aided field setting defining relevant normal and abnormal target anatomy and acquiring images with high frequency ultrasound in addition to data necessary developing optimal radiation treatment process for the patient. This process includes verification of the treatment port(s) and proper treatment positioning. All treatment ports were photographed within electronic medical record. The patient’s customized lead blocking along with gross tumor volume and margin was confirmed. Considering superficial radiotherapy is clinical in setup, this requires physician and radiation therapist to clarify location interest being treated against initial images, pathology and patient anatomy. Care was taken ensuring fields treated were geometrically accurate and properly positioned using therapeutic radiology simulation-aided field setting verification per fraction. This process is also utilized to determine if any prescription or setup changes are necessary. These steps are therefore medically necessary ensuring safe and effective administration of radiation. Ongoing therapeutic radiology simulation-aided field setting verification is ordered throughout course of therapy.\\n\\nA high frequency ultrasound image was acquired today for two-dimensional evaluation of the tumor volume and response to treatment, in addition to geometric accuracy of field placement. US depth is 1.88 mm, which is 0.03 mm in difference from previous imaging. The field placement and ultrasound imaging, per fraction, is separate and distinct from the initial simulation, and is an important task in providing safe administration of superficial radiation therapy. Physician evaluation of the ultrasound tumor depth will be ongoing throughout the course of treatment and is deemed medically necessary in order to ensure the efficacy of treatment and any necessary changes. Today’s image was evaluated for determination of continuation of treatment with the current plan or with necessary changes as appropriate. According to provider review of verification, therapeutic radiology simulation-aided field setting and imaging, no change is required. Additionally the use of ultrasound visualization and targeted assessment allows the patient to be able to see their cancer(s) progress, encouraging patient to complete and maintain compliance through full course of radiotherapy.\\n\\nPer Dr. Cano, continued ultrasound guidance and therapeutic radiology simulation-aided field setting verification per fraction is required for field placement, measurement of tumor depth, progress and acute effect monitoring.

## 2021-02-18 NOTE — PROCEDURE: SUPERFICIAL RADIATION TREATMENT
Simple Simulation Afterword Text Will Be Included With Simple Simulations (Indications............): The patient had a complete consultation regarding all applicable modalities for the treatment of their skin cancer and based on a variety of factors including the type of tumor, size, and location, the relevant medical history as well as local tissue factors, the functional status of the individual, the ability to perform necessary postoperative wound instructions and the need for simultaneous treatments as well as overall wound healing status, it was determined that the patient would begin radiation therapy treatment for skin cancer.  A full simulation and treatment device design was performed including the determination and formulation of appropriate simple and complex devices including lead shield of 0.762 mm thickness to form molded customized shielding to specifically correlate with the lesion size including treatment margin.  The custom lead shield is adequate to accommodate the appropriate applicator and provide adequate shielding around the treatment site.  The specific field applicator, shields, and devices both simple and complex as well as the specific patient setup is outlined below.  The patient was given a full consent for superficial radiation to both verbally and in writing and the full determination of patient's eligibility for treatment and selection is outlined on the patient eligibility and treatment selection form.  The specific superficial radiotherapy prescription was determined and was documented on the superficial radiotherapy prescription form.  A treatment calculation was also performed and documented on the treatment calculation form.  Based on the prescription, the patient was scheduled for a series of fractional treatments.
Energy (Optional-Please Include Units) Rx 2: 70 kV
Functional Status: 0 (fully active)
Information: Selecting Yes will display possible errors in your note based on the variables you have selected. This validation is only offered as a suggestion for you. PLEASE NOTE THAT THE VALIDATION TEXT WILL BE REMOVED WHEN YOU FINALIZE YOUR NOTE. IF YOU WANT TO FAX A PRELIMINARY NOTE YOU WILL NEED TO TOGGLE THIS TO 'NO' IF YOU DO NOT WANT IT IN YOUR FAXED NOTE.
Port Dimensions-Y Axis In Cm: 0
Fractions / Week: 3
Computed Treatment Time In Min (Will Render The Same As Calculated Treatment Time If Left Blank): 0.47
Render Additional Prescriptions In Note?: No
Dose Per Fractionation In Cgy (Optional): 277.77
Fractionation Number: 6
Bill For Radiation Treatment: Yes
Treatment Margins In Cm: 0.6
Custom Shielding Afterword Text Will Not Be Included With Simple Simulations (X X Y Cm............): port to correlate with the lesion size, including treatment margin. The custom lead shield is adequate to accommodate the appropriate applicator and provide adequate shielding around the treatment site. Additional shielding (as noted below) is used to protect sensitive, normal tissues.
Energy (Optional-Please Include Units): 100 kV
Field Number: 1
Intro Statement (Will Not Render If Left Blank): The patient is undergoing superficial radiation therapy for skin cancer and presents for weekly evaluation and management. Per protocol and as documented on the flow sheet, the patient was questioned as to subjective redness, pruritus, pain, drainage, fatigue, or any other symptoms. Objectively, the radiation area was evaluated with regards to erythema, atrophy, scale, crusting, erosion, ulceration, edema, purpura, tenderness, warmth, drainage, and any other findings. The plan was extensively reviewed including the dose, and dosing schedule. The simulation and clinical setup was also reviewed as was the external and any internal shields and based on this review the appropriateness and sufficiency of treatment was determined.
Total Dose (Optional-Please Include Units): 5555.40 cGy
Field Size (Applicator): 2.0 cm
Fractions / Week Rx 3: 5
Field Size (Applicator) Rx 2: 4.0 cm
Total Dose (Optional-Please Include Units) Rx 2: 2742.60 cGy
Treatment Time / Fractionation (Optional- Include Units): 0.47 min
Treatment Time / Fractionation (Optional- Include Units) Rx 2: 0.42 min
Dimensions-X Axis In Cm: 0.8
Treatment Device Design After Initial Simulation Justification (Will Render If Bill For Treatment Devices = Yes): The patient is status post radiation simulation and is evaluated as to the use of additional devices for shielding and placement for radiation therapy.
Time Dose Fractionation (Optional- Include Units If Applicable): 99
Day Of The Week Treatment Administered: Thursday
Total Number Of Fractions Rx 4: 15
Depth (Optional-Please Include Units): 2.02 mm
Custom Shielding Preamble Text Will Not Be Included With Simple Simulations (.......... X X Y Cm): A lead shield of 0.762 mm thickness is utilized to form a molded, custom shield with a
Dimensions-Y Axis In Cm: 0.7
Total Number Of Fractions: 20
Assessment: Appropriate reaction
Simple Simulation Preamble Text Will Be Included With Simple Simulations (.......... Indications): Simple simulation was performed today for the following reasons:
Patient Positioning: Supine
Comments: RTOG 0
Shielding Size (Optional- Include Units) Rx 2: 2.2 x 3.3 cm
Total Number Of Fractions Rx 2: 10
Detail Level: Detailed
Time Dose Fractionation (Optional- Include Units If Applicable) Rx 2: 49
Daily Fractionated Dose (Optional- Include Units): 277.77 cGy
Additional Prescription Justification Text: If there is any interruption in treatment exceeding 5 days please see Decay and Dose Adjustment Calculation and complete treatment under Prescription 2, 3 or 4 as appropriate.
Daily Fractionated Dose (Optional- Include Units) Rx 2: 274.26 cGy
Cumulative Dose In Cgy (Optional): 1666.62

## 2021-02-21 ENCOUNTER — APPOINTMENT (OUTPATIENT)
Dept: URBAN - METROPOLITAN AREA CLINIC 289 | Age: 66
Setting detail: DERMATOLOGY
End: 2021-02-23

## 2021-02-21 PROCEDURE — 77336 RADIATION PHYSICS CONSULT: CPT

## 2021-02-22 ENCOUNTER — APPOINTMENT (OUTPATIENT)
Dept: URBAN - METROPOLITAN AREA CLINIC 289 | Age: 66
Setting detail: DERMATOLOGY
End: 2021-02-23

## 2021-02-22 PROBLEM — C44.311 BASAL CELL CARCINOMA OF SKIN OF NOSE: Status: ACTIVE | Noted: 2021-02-22

## 2021-02-22 PROCEDURE — OTHER FOLLOW UP FOR NEXT VISIT: OTHER

## 2021-02-22 PROCEDURE — 77401 RADIATION TX DELIVERY SUPFC: CPT

## 2021-02-22 PROCEDURE — 77280 THER RAD SIMULAJ FIELD SMPL: CPT

## 2021-02-22 PROCEDURE — OTHER TREATMENT REGIMEN: OTHER

## 2021-02-22 PROCEDURE — G6001 ECHO GUIDANCE RADIOTHERAPY: HCPCS

## 2021-02-22 PROCEDURE — OTHER SUPERFICIAL RADIATION TREATMENT: OTHER

## 2021-02-22 NOTE — PROCEDURE: TREATMENT REGIMEN
Detail Level: Detailed
Plan: This patient has been treated today with image guided superficial radiation therapy for non-melanoma skin cancer.\\n\\nWritten informed consent has been previously obtained from this patient for this treatment. This consent is documented in the patient’s chart. The patient gave verbal consent to continue treatment today.\\n\\nThe patient was treated with a specific radiation dose and setup as prescribed by the provider listed on this visit note. A Radiation Therapist performed administration of radiation under supervision of provider. The treatment parameters and cumulative dose are indicated above.\\n\\nPrior to administering the radiation, the patient underwent a verification therapeutic radiology simulation-aided field setting defining relevant normal and abnormal target anatomy and acquiring images with high frequency ultrasound in addition to data necessary developing optimal radiation treatment process for the patient. This process includes verification of the treatment port(s) and proper treatment positioning. All treatment ports were photographed within electronic medical record. The patient’s customized lead blocking along with gross tumor volume and margin was confirmed. Considering superficial radiotherapy is clinical in setup, this requires physician and radiation therapist to clarify location interest being treated against initial images, pathology and patient anatomy. Care was taken ensuring fields treated were geometrically accurate and properly positioned using therapeutic radiology simulation-aided field setting verification per fraction. This process is also utilized to determine if any prescription or setup changes are necessary. These steps are therefore medically necessary ensuring safe and effective administration of radiation. Ongoing therapeutic radiology simulation-aided field setting verification is ordered throughout course of therapy.\\n\\nA high frequency ultrasound image was acquired today for two-dimensional evaluation of the tumor volume and response to treatment, in addition to geometric accuracy of field placement. US depth is 2.16 mm, which is 0.28 mm in difference from previous imaging. The field placement and ultrasound imaging, per fraction, is separate and distinct from the initial simulation, and is an important task in providing safe administration of superficial radiation therapy. Physician evaluation of the ultrasound tumor depth will be ongoing throughout the course of treatment and is deemed medically necessary in order to ensure the efficacy of treatment and any necessary changes. Today’s image was evaluated for determination of continuation of treatment with the current plan or with necessary changes as appropriate. According to provider review of verification, therapeutic radiology simulation-aided field setting and imaging, no change is required. Additionally the use of ultrasound visualization and targeted assessment allows the patient to be able to see their cancer(s) progress, encouraging patient to complete and maintain compliance through full course of radiotherapy.\\n\\nPer Dr. Cano, continued ultrasound guidance and therapeutic radiology simulation-aided field setting verification per fraction is required for field placement, measurement of tumor depth, progress and acute effect monitoring.

## 2021-02-22 NOTE — PROCEDURE: SUPERFICIAL RADIATION TREATMENT
Treatment Device Design After Initial Simulation Justification (Will Render If Bill For Treatment Devices = Yes): The patient is status post radiation simulation and is evaluated as to the use of additional devices for shielding and placement for radiation therapy.
Render Prescriptions In Note?: No
Dose / Tx In Cgy (Optional): 277.77
Assessment: Appropriate reaction
Dimensions-Y Axis In Cm: 0.7
Total Dose (Optional-Please Include Units) Rx 2: 2742.60 cGy
Detail Level: Detailed
Energy (Optional-Please Include Units): 100 kV
Prescription Used: 1
Functional Status: 0 (fully active)
Intro Statement (Will Not Render If Left Blank): The patient is undergoing superficial radiation therapy for skin cancer and presents for weekly evaluation and management. Per protocol and as documented on the flow sheet, the patient was questioned as to subjective redness, pruritus, pain, drainage, fatigue, or any other symptoms. Objectively, the radiation area was evaluated with regards to erythema, atrophy, scale, crusting, erosion, ulceration, edema, purpura, tenderness, warmth, drainage, and any other findings. The plan was extensively reviewed including the dose, and dosing schedule. The simulation and clinical setup was also reviewed as was the external and any internal shields and based on this review the appropriateness and sufficiency of treatment was determined.
Initial Radiation Treatment Planning (Will Render If Bill Simulation = Yes): The patient had a complete consultation regarding all applicable modalities for the treatment of their skin cancer and based on a variety of factors including the type of tumor, size, and location, the relevant medical history as well as local tissue factors, the functional status of the individual, the ability to perform necessary postoperative wound instructions and the need for simultaneous treatments as well as overall wound healing status, it was determined that the patient would begin radiation therapy treatment for skin cancer.  A full simulation and treatment device design was performed including the determination and formulation of appropriate simple and complex devices including lead shield of 0.762 mm thickness to form molded customized shielding to specifically correlate with the lesion size including treatment margin.  The custom lead shield is adequate to accommodate the appropriate applicator and provide adequate shielding around the treatment site.  The specific field applicator, shields, and devices both simple and complex as well as the specific patient setup is outlined below.  The patient was given a full consent for superficial radiation to both verbally and in writing and the full determination of patient's eligibility for treatment and selection is outlined on the patient eligibility and treatment selection form.  The specific superficial radiotherapy prescription was determined and was documented on the superficial radiotherapy prescription form.  A treatment calculation was also performed and documented on the treatment calculation form.  Based on the prescription, the patient was scheduled for a series of fractional treatments.
Treatment Margins In Cm: 0.6
Treatment Time / Fractionation (Optional- Include Units): 0.47 min
Daily Fractionated Dose (Optional- Include Units) Rx 2: 274.26 cGy
Bill For Radiation Treatment: Yes
Computed Treatment Time In Min (Will Render The Same As Calculated Treatment Time If Left Blank): 0.47
Field Size (Applicator): 2.0 cm
Additional Prescription Justification Text: If there is any interruption in treatment exceeding 5 days please see Decay and Dose Adjustment Calculation and complete treatment under Prescription 2, 3 or 4 as appropriate.
Simple Simulation Preamble Text Will Be Included With Simple Simulations (.......... Indications): Simple simulation was performed today for the following reasons:
Port Dimensions-Y Axis In Cm: 0
Treatment Time / Fractionation (Optional- Include Units) Rx 2: 0.42 min
Field Size (Applicator) Rx 2: 4.0 cm
Cumulative Dose In Cgy (Optional): 1944.39
Depth (Optional-Please Include Units): 2.02 mm
Day Of The Week Treatment Administered: Monday
Fractions / Week Rx 3: 5
Dimensions-X Axis In Cm: 0.8
Total Number Of Fractions Rx 2: 10
Custom Shielding Preamble Text Will Not Be Included With Simple Simulations (.......... X X Y Cm): A lead shield of 0.762 mm thickness is utilized to form a molded, custom shield with a
Shielding Size (Optional- Include Units) Rx 2: 2.2 x 3.3 cm
Fractions / Week Rx 2: 3
Total Number Of Fractions: 20
Information: Selecting Yes will display possible errors in your note based on the variables you have selected. This validation is only offered as a suggestion for you. PLEASE NOTE THAT THE VALIDATION TEXT WILL BE REMOVED WHEN YOU FINALIZE YOUR NOTE. IF YOU WANT TO FAX A PRELIMINARY NOTE YOU WILL NEED TO TOGGLE THIS TO 'NO' IF YOU DO NOT WANT IT IN YOUR FAXED NOTE.
Patient Positioning: Supine
Total Number Of Fractions Rx 4: 15
Comments: RTOG 0
Time Dose Fractionation (Optional- Include Units If Applicable) Rx 2: 49
Fractionation Number: 7
Time Dose Fractionation (Optional- Include Units If Applicable): 99
Total Dose (Optional-Please Include Units): 5555.40 cGy
Custom Shielding Afterword Text Will Not Be Included With Simple Simulations (X X Y Cm............): port to correlate with the lesion size, including treatment margin. The custom lead shield is adequate to accommodate the appropriate applicator and provide adequate shielding around the treatment site. Additional shielding (as noted below) is used to protect sensitive, normal tissues.
Daily Fractionated Dose (Optional- Include Units): 277.77 cGy
Energy (Optional-Please Include Units) Rx 2: 70 kV

## 2021-02-24 ENCOUNTER — APPOINTMENT (OUTPATIENT)
Dept: URBAN - METROPOLITAN AREA CLINIC 289 | Age: 66
Setting detail: DERMATOLOGY
End: 2021-02-24

## 2021-02-24 PROBLEM — C44.311 BASAL CELL CARCINOMA OF SKIN OF NOSE: Status: ACTIVE | Noted: 2021-02-24

## 2021-02-24 PROCEDURE — 77401 RADIATION TX DELIVERY SUPFC: CPT

## 2021-02-24 PROCEDURE — 77280 THER RAD SIMULAJ FIELD SMPL: CPT

## 2021-02-24 PROCEDURE — OTHER FOLLOW UP FOR NEXT VISIT: OTHER

## 2021-02-24 PROCEDURE — OTHER TREATMENT REGIMEN: OTHER

## 2021-02-24 PROCEDURE — G6001 ECHO GUIDANCE RADIOTHERAPY: HCPCS

## 2021-02-24 PROCEDURE — OTHER SUPERFICIAL RADIATION TREATMENT: OTHER

## 2021-02-24 NOTE — PROCEDURE: TREATMENT REGIMEN

## 2021-02-24 NOTE — PROCEDURE: SUPERFICIAL RADIATION TREATMENT
Render Patient Eligibility And Selection In Note?: No
Computed Treatment Time In Min (Will Render The Same As Calculated Treatment Time If Left Blank): 0.47
Functional Status: 0 (fully active)
Simple Simulation Afterword Text Will Be Included With Simple Simulations (Indications............): The patient had a complete consultation regarding all applicable modalities for the treatment of their skin cancer and based on a variety of factors including the type of tumor, size, and location, the relevant medical history as well as local tissue factors, the functional status of the individual, the ability to perform necessary postoperative wound instructions and the need for simultaneous treatments as well as overall wound healing status, it was determined that the patient would begin radiation therapy treatment for skin cancer.  A full simulation and treatment device design was performed including the determination and formulation of appropriate simple and complex devices including lead shield of 0.762 mm thickness to form molded customized shielding to specifically correlate with the lesion size including treatment margin.  The custom lead shield is adequate to accommodate the appropriate applicator and provide adequate shielding around the treatment site.  The specific field applicator, shields, and devices both simple and complex as well as the specific patient setup is outlined below.  The patient was given a full consent for superficial radiation to both verbally and in writing and the full determination of patient's eligibility for treatment and selection is outlined on the patient eligibility and treatment selection form.  The specific superficial radiotherapy prescription was determined and was documented on the superficial radiotherapy prescription form.  A treatment calculation was also performed and documented on the treatment calculation form.  Based on the prescription, the patient was scheduled for a series of fractional treatments.
Total Dose (Optional-Please Include Units): 5555.40 cGy
Treatment Device Design After Initial Simulation Justification (Will Render If Bill For Treatment Devices = Yes): The patient is status post radiation simulation and is evaluated as to the use of additional devices for shielding and placement for radiation therapy.
Number Of Treatment Days: 1
Port Dimensions-Y Axis In Cm: 0
Treatment Time / Fractionation (Optional- Include Units) Rx 2: 0.42 min
Treatment Margins In Cm: 0.6
Energy (Optional-Please Include Units): 100 kV
Fractions / Week Rx 2: 3
Total Number Of Fractions Rx 4: 15
Daily Fractionated Dose (Optional- Include Units): 277.77 cGy
Additional Prescription Justification Text: If there is any interruption in treatment exceeding 5 days please see Decay and Dose Adjustment Calculation and complete treatment under Prescription 2, 3 or 4 as appropriate.
Fractions / Week Rx 4: 5
Validate Note Data (See Information Below): Yes
Day Of The Week Treatment Administered: Wednesday
Custom Shielding Afterword Text Will Not Be Included With Simple Simulations (X X Y Cm............): port to correlate with the lesion size, including treatment margin. The custom lead shield is adequate to accommodate the appropriate applicator and provide adequate shielding around the treatment site. Additional shielding (as noted below) is used to protect sensitive, normal tissues.
Comments: RTOG 0
Assessment: Appropriate reaction
Field Size (Applicator): 2.0 cm
Total Dose (Optional-Please Include Units) Rx 2: 2742.60 cGy
Field Size (Applicator) Rx 2: 4.0 cm
Total Number Of Fractions Rx 2: 10
Dose Per Fractionation In Cgy (Optional): 277.77
Time Dose Fractionation (Optional- Include Units If Applicable) Rx 2: 49
Dimensions-X Axis In Cm: 0.8
Depth (Optional-Please Include Units): 2.02 mm
Fractionation Number: 8
Treatment Time / Fractionation (Optional- Include Units): 0.47 min
Information: Selecting Yes will display possible errors in your note based on the variables you have selected. This validation is only offered as a suggestion for you. PLEASE NOTE THAT THE VALIDATION TEXT WILL BE REMOVED WHEN YOU FINALIZE YOUR NOTE. IF YOU WANT TO FAX A PRELIMINARY NOTE YOU WILL NEED TO TOGGLE THIS TO 'NO' IF YOU DO NOT WANT IT IN YOUR FAXED NOTE.
Intro Statement (Will Not Render If Left Blank): The patient is undergoing superficial radiation therapy for skin cancer and presents for weekly evaluation and management. Per protocol and as documented on the flow sheet, the patient was questioned as to subjective redness, pruritus, pain, drainage, fatigue, or any other symptoms. Objectively, the radiation area was evaluated with regards to erythema, atrophy, scale, crusting, erosion, ulceration, edema, purpura, tenderness, warmth, drainage, and any other findings. The plan was extensively reviewed including the dose, and dosing schedule. The simulation and clinical setup was also reviewed as was the external and any internal shields and based on this review the appropriateness and sufficiency of treatment was determined.
Patient Positioning: Supine
Dimensions-Y Axis In Cm: 0.7
Shielding Size (Optional- Include Units) Rx 2: 2.2 x 3.3 cm
Simple Simulation Preamble Text Will Be Included With Simple Simulations (.......... Indications): Simple simulation was performed today for the following reasons:
Time Dose Fractionation (Optional- Include Units If Applicable): 99
Total Number Of Fractions: 20
Cumulative Dose In Cgy (Optional): 2222.16
Custom Shielding Preamble Text Will Not Be Included With Simple Simulations (.......... X X Y Cm): A lead shield of 0.762 mm thickness is utilized to form a molded, custom shield with a
Detail Level: Detailed
Daily Fractionated Dose (Optional- Include Units) Rx 2: 274.26 cGy
Energy (Optional-Please Include Units) Rx 2: 70 kV

## 2021-02-25 ENCOUNTER — APPOINTMENT (OUTPATIENT)
Dept: URBAN - METROPOLITAN AREA CLINIC 289 | Age: 66
Setting detail: DERMATOLOGY
End: 2021-02-25

## 2021-02-25 PROBLEM — C44.311 BASAL CELL CARCINOMA OF SKIN OF NOSE: Status: ACTIVE | Noted: 2021-02-25

## 2021-02-25 PROCEDURE — OTHER TREATMENT REGIMEN: OTHER

## 2021-02-25 PROCEDURE — G6001 ECHO GUIDANCE RADIOTHERAPY: HCPCS

## 2021-02-25 PROCEDURE — 77401 RADIATION TX DELIVERY SUPFC: CPT

## 2021-02-25 PROCEDURE — OTHER SUPERFICIAL RADIATION TREATMENT: OTHER

## 2021-02-25 PROCEDURE — 77280 THER RAD SIMULAJ FIELD SMPL: CPT

## 2021-02-25 PROCEDURE — OTHER FOLLOW UP FOR NEXT VISIT: OTHER

## 2021-02-25 NOTE — PROCEDURE: SUPERFICIAL RADIATION TREATMENT
Field Size (Applicator) Rx 2: 4.0 cm
Dimensions-Y Axis In Cm: 0.7
Number Of Treatment Days: 1
Treatment Time / Fractionation (Optional- Include Units): 0.47 min
Validate Note Data (See Information Below): Yes
Fractions / Week Rx 3: 5
Day Of The Week Treatment Administered: Thursday
Daily Fractionated Dose (Optional- Include Units) Rx 2: 274.26 cGy
Port Dimensions-X Axis In Cm: 0
Treatment Device Design After Initial Simulation Justification (Will Render If Bill For Treatment Devices = Yes): The patient is status post radiation simulation and is evaluated as to the use of additional devices for shielding and placement for radiation therapy.
Fractionation Number: 9
Time Dose Fractionation (Optional- Include Units If Applicable): 99
Dose Per Fractionation In Cgy (Optional): 277.77
Bill For Dosimetry/Render Treatment Time Calculation In Note: No
Patient Positioning: Supine
Total Number Of Fractions Rx 3: 15
Treatment Time / Fractionation (Optional- Include Units) Rx 2: 0.42 min
Energy (Include Units): 100 kV
Total Number Of Fractions Rx 2: 10
Field Size (Applicator): 2.0 cm
Additional Prescription Justification Text: If there is any interruption in treatment exceeding 5 days please see Decay and Dose Adjustment Calculation and complete treatment under Prescription 2, 3 or 4 as appropriate.
Custom Shielding Preamble Text Will Not Be Included With Simple Simulations (.......... X X Y Cm): A lead shield of 0.762 mm thickness is utilized to form a molded, custom shield with a
Fractions / Week Rx 2: 3
Simple Simulation Preamble Text Will Be Included With Simple Simulations (.......... Indications): Simple simulation was performed today for the following reasons:
Comments: RTOG 0
Total Number Of Fractions: 20
Treatment Time In Min (Optional): 0.47
Cumulative Dose In Cgy (Optional): 2499.93
Custom Shielding Afterword Text Will Not Be Included With Simple Simulations (X X Y Cm............): port to correlate with the lesion size, including treatment margin. The custom lead shield is adequate to accommodate the appropriate applicator and provide adequate shielding around the treatment site. Additional shielding (as noted below) is used to protect sensitive, normal tissues.
Shielding Size (Optional- Include Units) Rx 2: 2.2 x 3.3 cm
Depth (Optional-Please Include Units): 2.02 mm
Total Dose (Optional-Please Include Units) Rx 2: 2742.60 cGy
Daily Fractionated Dose (Optional- Include Units): 277.77 cGy
Dimensions-X Axis In Cm: 0.8
Information: Selecting Yes will display possible errors in your note based on the variables you have selected. This validation is only offered as a suggestion for you. PLEASE NOTE THAT THE VALIDATION TEXT WILL BE REMOVED WHEN YOU FINALIZE YOUR NOTE. IF YOU WANT TO FAX A PRELIMINARY NOTE YOU WILL NEED TO TOGGLE THIS TO 'NO' IF YOU DO NOT WANT IT IN YOUR FAXED NOTE.
Total Dose (Optional-Please Include Units): 5555.40 cGy
Intro Statement (Will Not Render If Left Blank): The patient is undergoing superficial radiation therapy for skin cancer and presents for weekly evaluation and management. Per protocol and as documented on the flow sheet, the patient was questioned as to subjective redness, pruritus, pain, drainage, fatigue, or any other symptoms. Objectively, the radiation area was evaluated with regards to erythema, atrophy, scale, crusting, erosion, ulceration, edema, purpura, tenderness, warmth, drainage, and any other findings. The plan was extensively reviewed including the dose, and dosing schedule. The simulation and clinical setup was also reviewed as was the external and any internal shields and based on this review the appropriateness and sufficiency of treatment was determined.
Time Dose Fractionation (Optional- Include Units If Applicable) Rx 2: 49
Simple Simulation Afterword Text Will Be Included With Simple Simulations (Indications............): The patient had a complete consultation regarding all applicable modalities for the treatment of their skin cancer and based on a variety of factors including the type of tumor, size, and location, the relevant medical history as well as local tissue factors, the functional status of the individual, the ability to perform necessary postoperative wound instructions and the need for simultaneous treatments as well as overall wound healing status, it was determined that the patient would begin radiation therapy treatment for skin cancer.  A full simulation and treatment device design was performed including the determination and formulation of appropriate simple and complex devices including lead shield of 0.762 mm thickness to form molded customized shielding to specifically correlate with the lesion size including treatment margin.  The custom lead shield is adequate to accommodate the appropriate applicator and provide adequate shielding around the treatment site.  The specific field applicator, shields, and devices both simple and complex as well as the specific patient setup is outlined below.  The patient was given a full consent for superficial radiation to both verbally and in writing and the full determination of patient's eligibility for treatment and selection is outlined on the patient eligibility and treatment selection form.  The specific superficial radiotherapy prescription was determined and was documented on the superficial radiotherapy prescription form.  A treatment calculation was also performed and documented on the treatment calculation form.  Based on the prescription, the patient was scheduled for a series of fractional treatments.
Energy (Optional-Please Include Units) Rx 2: 70 kV
Functional Status: 0 (fully active)
Detail Level: Detailed
Treatment Margins In Cm: 0.6
Assessment: Appropriate reaction

## 2021-02-25 NOTE — PROCEDURE: TREATMENT REGIMEN
Detail Level: Detailed
Plan: This patient has been treated today with image guided superficial radiation therapy for non-melanoma skin cancer.\\n\\nWritten informed consent has been previously obtained from this patient for this treatment. This consent is documented in the patient’s chart. The patient gave verbal consent to continue treatment today.\\n\\nThe patient was treated with a specific radiation dose and setup as prescribed by the provider listed on this visit note. A Radiation Therapist performed administration of radiation under supervision of provider. The treatment parameters and cumulative dose are indicated above.\\n\\nPrior to administering the radiation, the patient underwent a verification therapeutic radiology simulation-aided field setting defining relevant normal and abnormal target anatomy and acquiring images with high frequency ultrasound in addition to data necessary developing optimal radiation treatment process for the patient. This process includes verification of the treatment port(s) and proper treatment positioning. All treatment ports were photographed within electronic medical record. The patient’s customized lead blocking along with gross tumor volume and margin was confirmed. Considering superficial radiotherapy is clinical in setup, this requires physician and radiation therapist to clarify location interest being treated against initial images, pathology and patient anatomy. Care was taken ensuring fields treated were geometrically accurate and properly positioned using therapeutic radiology simulation-aided field setting verification per fraction. This process is also utilized to determine if any prescription or setup changes are necessary. These steps are therefore medically necessary ensuring safe and effective administration of radiation. Ongoing therapeutic radiology simulation-aided field setting verification is ordered throughout course of therapy.\\n\\nA high frequency ultrasound image was acquired today for two-dimensional evaluation of the tumor volume and response to treatment, in addition to geometric accuracy of field placement. US depth is 2.08 mm, which is 0.04 mm in difference from previous imaging. The field placement and ultrasound imaging, per fraction, is separate and distinct from the initial simulation, and is an important task in providing safe administration of superficial radiation therapy. Physician evaluation of the ultrasound tumor depth will be ongoing throughout the course of treatment and is deemed medically necessary in order to ensure the efficacy of treatment and any necessary changes. Today’s image was evaluated for determination of continuation of treatment with the current plan or with necessary changes as appropriate. According to provider review of verification, therapeutic radiology simulation-aided field setting and imaging, no change is required. Additionally the use of ultrasound visualization and targeted assessment allows the patient to be able to see their cancer(s) progress, encouraging patient to complete and maintain compliance through full course of radiotherapy.\\n\\nPer Dr. Cano, continued ultrasound guidance and therapeutic radiology simulation-aided field setting verification per fraction is required for field placement, measurement of tumor depth, progress and acute effect monitoring.

## 2021-03-01 ENCOUNTER — APPOINTMENT (OUTPATIENT)
Dept: URBAN - METROPOLITAN AREA CLINIC 289 | Age: 66
Setting detail: DERMATOLOGY
End: 2021-03-03

## 2021-03-01 PROBLEM — C44.311 BASAL CELL CARCINOMA OF SKIN OF NOSE: Status: ACTIVE | Noted: 2021-03-01

## 2021-03-01 PROCEDURE — OTHER TREATMENT REGIMEN: OTHER

## 2021-03-01 PROCEDURE — OTHER SUPERFICIAL RADIATION TREATMENT: OTHER

## 2021-03-01 PROCEDURE — OTHER FOLLOW UP FOR NEXT VISIT: OTHER

## 2021-03-01 PROCEDURE — 77401 RADIATION TX DELIVERY SUPFC: CPT

## 2021-03-01 PROCEDURE — 77280 THER RAD SIMULAJ FIELD SMPL: CPT

## 2021-03-01 PROCEDURE — G6001 ECHO GUIDANCE RADIOTHERAPY: HCPCS

## 2021-03-01 NOTE — PROCEDURE: SUPERFICIAL RADIATION TREATMENT
Include Rx 4 When Rendering Additional Prescriptions: No
Custom Shielding Afterword Text Will Not Be Included With Simple Simulations (X X Y Cm............): port to correlate with the lesion size, including treatment margin. The custom lead shield is adequate to accommodate the appropriate applicator and provide adequate shielding around the treatment site. Additional shielding (as noted below) is used to protect sensitive, normal tissues.
Patient Positioning: Supine
Field Size (Applicator): 2.0 cm
Shielding Size (Optional- Include Units) Rx 2: 2.2 x 3.3 cm
Fractions / Week: 3
Total Dose (Optional-Please Include Units) Rx 2: 2742.60 cGy
Time Dose Fractionation (Optional- Include Units If Applicable): 99
Comments: RTOG 0
Total Number Of Fractions: 20
Port Dimensions-X Axis In Cm: 0
Simple Simulation Afterword Text Will Be Included With Simple Simulations (Indications............): The patient had a complete consultation regarding all applicable modalities for the treatment of their skin cancer and based on a variety of factors including the type of tumor, size, and location, the relevant medical history as well as local tissue factors, the functional status of the individual, the ability to perform necessary postoperative wound instructions and the need for simultaneous treatments as well as overall wound healing status, it was determined that the patient would begin radiation therapy treatment for skin cancer.  A full simulation and treatment device design was performed including the determination and formulation of appropriate simple and complex devices including lead shield of 0.762 mm thickness to form molded customized shielding to specifically correlate with the lesion size including treatment margin.  The custom lead shield is adequate to accommodate the appropriate applicator and provide adequate shielding around the treatment site.  The specific field applicator, shields, and devices both simple and complex as well as the specific patient setup is outlined below.  The patient was given a full consent for superficial radiation to both verbally and in writing and the full determination of patient's eligibility for treatment and selection is outlined on the patient eligibility and treatment selection form.  The specific superficial radiotherapy prescription was determined and was documented on the superficial radiotherapy prescription form.  A treatment calculation was also performed and documented on the treatment calculation form.  Based on the prescription, the patient was scheduled for a series of fractional treatments.
Daily Fractionated Dose (Optional- Include Units) Rx 2: 274.26 cGy
Field Number: 1
Computed Treatment Time In Min (Will Render The Same As Calculated Treatment Time If Left Blank): 0.47
Dose / Tx In Cgy (Optional): 277.77
Treatment Margins In Cm: 0.6
Fractionation Number: 10
Energy (Optional-Please Include Units) Rx 2: 70 kV
Treatment Device Design After Initial Simulation Justification (Will Render If Bill For Treatment Devices = Yes): The patient is status post radiation simulation and is evaluated as to the use of additional devices for shielding and placement for radiation therapy.
Energy (Optional-Please Include Units): 100 kV
Total Dose (Optional-Please Include Units): 5555.40 cGy
Fractions / Week Rx 3: 5
Total Number Of Fractions Rx 3: 15
Information: Selecting Yes will display possible errors in your note based on the variables you have selected. This validation is only offered as a suggestion for you. PLEASE NOTE THAT THE VALIDATION TEXT WILL BE REMOVED WHEN YOU FINALIZE YOUR NOTE. IF YOU WANT TO FAX A PRELIMINARY NOTE YOU WILL NEED TO TOGGLE THIS TO 'NO' IF YOU DO NOT WANT IT IN YOUR FAXED NOTE.
Detail Level: Detailed
Treatment Time / Fractionation (Optional- Include Units) Rx 2: 0.42 min
Daily Fractionated Dose (Optional- Include Units): 277.77 cGy
Intro Statement (Will Not Render If Left Blank): The patient is undergoing superficial radiation therapy for skin cancer and presents for weekly evaluation and management. Per protocol and as documented on the flow sheet, the patient was questioned as to subjective redness, pruritus, pain, drainage, fatigue, or any other symptoms. Objectively, the radiation area was evaluated with regards to erythema, atrophy, scale, crusting, erosion, ulceration, edema, purpura, tenderness, warmth, drainage, and any other findings. The plan was extensively reviewed including the dose, and dosing schedule. The simulation and clinical setup was also reviewed as was the external and any internal shields and based on this review the appropriateness and sufficiency of treatment was determined.
Cumulative Dose In Cgy (Optional): 2777.70
Assessment: Appropriate reaction
Depth (Optional-Please Include Units): 2.02 mm
Custom Shielding Preamble Text Will Not Be Included With Simple Simulations (.......... X X Y Cm): A lead shield of 0.762 mm thickness is utilized to form a molded, custom shield with a
Additional Prescription Justification Text: If there is any interruption in treatment exceeding 5 days please see Decay and Dose Adjustment Calculation and complete treatment under Prescription 2, 3 or 4 as appropriate.
Dimensions-X Axis In Cm: 0.8
Day Of The Week Treatment Administered: Monday
Field Size (Applicator) Rx 2: 4.0 cm
Bill For Radiation Treatment: Yes
Treatment Time / Fractionation (Optional- Include Units): 0.47 min
Time Dose Fractionation (Optional- Include Units If Applicable) Rx 2: 49
Dimensions-Y Axis In Cm: 0.7
Simple Simulation Preamble Text Will Be Included With Simple Simulations (.......... Indications): Simple simulation was performed today for the following reasons:
Functional Status: 0 (fully active)

## 2021-03-01 NOTE — PROCEDURE: TREATMENT REGIMEN

## 2021-03-03 ENCOUNTER — APPOINTMENT (OUTPATIENT)
Dept: URBAN - METROPOLITAN AREA CLINIC 289 | Age: 66
Setting detail: DERMATOLOGY
End: 2021-03-03

## 2021-03-03 PROBLEM — C44.311 BASAL CELL CARCINOMA OF SKIN OF NOSE: Status: ACTIVE | Noted: 2021-03-03

## 2021-03-03 PROCEDURE — 77280 THER RAD SIMULAJ FIELD SMPL: CPT

## 2021-03-03 PROCEDURE — 77427 RADIATION TX MANAGEMENT X5: CPT | Mod: 59

## 2021-03-03 PROCEDURE — OTHER FOLLOW UP FOR NEXT VISIT: OTHER

## 2021-03-03 PROCEDURE — OTHER SUPERFICIAL RADIATION TREATMENT: OTHER

## 2021-03-03 PROCEDURE — G6001 ECHO GUIDANCE RADIOTHERAPY: HCPCS

## 2021-03-03 PROCEDURE — 77401 RADIATION TX DELIVERY SUPFC: CPT

## 2021-03-03 PROCEDURE — OTHER TREATMENT REGIMEN: OTHER

## 2021-03-03 NOTE — PROCEDURE: TREATMENT REGIMEN

## 2021-03-03 NOTE — PROCEDURE: SUPERFICIAL RADIATION TREATMENT
Initial Radiation Treatment Planning (Will Render If Bill Simulation = Yes): The patient had a complete consultation regarding all applicable modalities for the treatment of their skin cancer and based on a variety of factors including the type of tumor, size, and location, the relevant medical history as well as local tissue factors, the functional status of the individual, the ability to perform necessary postoperative wound instructions and the need for simultaneous treatments as well as overall wound healing status, it was determined that the patient would begin radiation therapy treatment for skin cancer.  A full simulation and treatment device design was performed including the determination and formulation of appropriate simple and complex devices including lead shield of 0.762 mm thickness to form molded customized shielding to specifically correlate with the lesion size including treatment margin.  The custom lead shield is adequate to accommodate the appropriate applicator and provide adequate shielding around the treatment site.  The specific field applicator, shields, and devices both simple and complex as well as the specific patient setup is outlined below.  The patient was given a full consent for superficial radiation to both verbally and in writing and the full determination of patient's eligibility for treatment and selection is outlined on the patient eligibility and treatment selection form.  The specific superficial radiotherapy prescription was determined and was documented on the superficial radiotherapy prescription form.  A treatment calculation was also performed and documented on the treatment calculation form.  Based on the prescription, the patient was scheduled for a series of fractional treatments.
Bill For Dosimetry/Render Decay And Dose Adjustment Calculation In Note: No
Total Number Of Fractions Rx 2: 10
Port Dimensions-Y Axis In Cm: 0
Energy (Optional-Please Include Units): 100 kV
Total Dose (Optional-Please Include Units): 5555.40 cGy
Treatment Device Design After Initial Simulation Justification (Will Render If Bill For Treatment Devices = Yes): The patient is status post radiation simulation and is evaluated as to the use of additional devices for shielding and placement for radiation therapy.
Bill For Radiation Treatment: Yes
Field Size (Applicator) Rx 2: 4.0 cm
Dose / Tx In Cgy (Optional): 277.77
Number Of Treatment Days: 1
Dimensions-Y Axis In Cm: 0.7
Detail Level: Detailed
Functional Status: 0 (fully active)
Daily Fractionated Dose (Optional- Include Units) Rx 2: 274.26 cGy
Treatment Time / Fractionation (Optional- Include Units): 0.47 min
Time Dose Fractionation (Optional- Include Units If Applicable): 99
Day Of The Week Treatment Administered: Wednesday
Field Size (Applicator): 2.0 cm
Custom Shielding Preamble Text Will Not Be Included With Simple Simulations (.......... X X Y Cm): A lead shield of 0.762 mm thickness is utilized to form a molded, custom shield with a
Simple Simulation Preamble Text Will Be Included With Simple Simulations (.......... Indications): Simple simulation was performed today for the following reasons:
Total Number Of Fractions Rx 4: 15
Total Dose (Optional-Please Include Units) Rx 2: 2742.60 cGy
Comments: RTOG 2
Shielding Size (Optional- Include Units) Rx 2: 2.2 x 3.3 cm
Depth (Optional-Please Include Units): 2.02 mm
Fractions / Week Rx 2: 3
Additional Prescription Justification Text: If there is any interruption in treatment exceeding 5 days please see Decay and Dose Adjustment Calculation and complete treatment under Prescription 2, 3 or 4 as appropriate.
Fractions / Week Rx 3: 5
Cumulative Dose In Cgy (Optional): 3055.47
Treatment Time / Fractionation (Optional- Include Units) Rx 2: 0.42 min
Total Number Of Fractions: 20
Treatment Margins In Cm: 0.6
Assessment: Appropriate reaction
Treatment Time In Min (Optional): 0.47
Custom Shielding Afterword Text Will Not Be Included With Simple Simulations (X X Y Cm............): port to correlate with the lesion size, including treatment margin. The custom lead shield is adequate to accommodate the appropriate applicator and provide adequate shielding around the treatment site. Additional shielding (as noted below) is used to protect sensitive, normal tissues.
Daily Fractionated Dose (Optional- Include Units): 277.77 cGy
Information: Selecting Yes will display possible errors in your note based on the variables you have selected. This validation is only offered as a suggestion for you. PLEASE NOTE THAT THE VALIDATION TEXT WILL BE REMOVED WHEN YOU FINALIZE YOUR NOTE. IF YOU WANT TO FAX A PRELIMINARY NOTE YOU WILL NEED TO TOGGLE THIS TO 'NO' IF YOU DO NOT WANT IT IN YOUR FAXED NOTE.
Fractionation Number: 11
Dimensions-X Axis In Cm: 0.8
Time Dose Fractionation (Optional- Include Units If Applicable) Rx 2: 49
Patient Positioning: Supine
Energy (Optional-Please Include Units) Rx 2: 70 kV
Intro Statement (Will Not Render If Left Blank): The patient is undergoing superficial radiation therapy for skin cancer and presents for weekly evaluation and management. Per protocol and as documented on the flow sheet, the patient was questioned as to subjective redness, pruritus, pain, drainage, fatigue, or any other symptoms. Objectively, the radiation area was evaluated with regards to erythema, atrophy, scale, crusting, erosion, ulceration, edema, purpura, tenderness, warmth, drainage, and any other findings. The plan was extensively reviewed including the dose, and dosing schedule. The simulation and clinical setup was also reviewed as was the external and any internal shields and based on this review the appropriateness and sufficiency of treatment was determined.

## 2021-03-04 ENCOUNTER — APPOINTMENT (OUTPATIENT)
Dept: URBAN - METROPOLITAN AREA CLINIC 289 | Age: 66
Setting detail: DERMATOLOGY
End: 2021-03-04

## 2021-03-04 PROBLEM — C44.311 BASAL CELL CARCINOMA OF SKIN OF NOSE: Status: ACTIVE | Noted: 2021-03-04

## 2021-03-04 PROCEDURE — G6001 ECHO GUIDANCE RADIOTHERAPY: HCPCS

## 2021-03-04 PROCEDURE — 77280 THER RAD SIMULAJ FIELD SMPL: CPT

## 2021-03-04 PROCEDURE — OTHER SUPERFICIAL RADIATION TREATMENT: OTHER

## 2021-03-04 PROCEDURE — OTHER TREATMENT REGIMEN: OTHER

## 2021-03-04 PROCEDURE — OTHER FOLLOW UP FOR NEXT VISIT: OTHER

## 2021-03-04 PROCEDURE — 77401 RADIATION TX DELIVERY SUPFC: CPT

## 2021-03-04 NOTE — PROCEDURE: TREATMENT REGIMEN

## 2021-03-04 NOTE — PROCEDURE: SUPERFICIAL RADIATION TREATMENT
Additional Prescription Justification Text: If there is any interruption in treatment exceeding 5 days please see Decay and Dose Adjustment Calculation and complete treatment under Prescription 2, 3 or 4 as appropriate.
Detail Level: Detailed
Daily Fractionated Dose (Optional- Include Units) Rx 2: 274.26 cGy
Dose / Tx In Cgy (Optional): 277.77
Intro Statement (Will Not Render If Left Blank): The patient is undergoing superficial radiation therapy for skin cancer and presents for weekly evaluation and management. Per protocol and as documented on the flow sheet, the patient was questioned as to subjective redness, pruritus, pain, drainage, fatigue, or any other symptoms. Objectively, the radiation area was evaluated with regards to erythema, atrophy, scale, crusting, erosion, ulceration, edema, purpura, tenderness, warmth, drainage, and any other findings. The plan was extensively reviewed including the dose, and dosing schedule. The simulation and clinical setup was also reviewed as was the external and any internal shields and based on this review the appropriateness and sufficiency of treatment was determined.
Shielding Size (Optional- Include Units) Rx 2: 2.2 x 3.3 cm
Bill For Radiation Treatment: Yes
Total Number Of Fractions: 20
Prescription Used: 1
Simple Simulation Afterword Text Will Be Included With Simple Simulations (Indications............): The patient had a complete consultation regarding all applicable modalities for the treatment of their skin cancer and based on a variety of factors including the type of tumor, size, and location, the relevant medical history as well as local tissue factors, the functional status of the individual, the ability to perform necessary postoperative wound instructions and the need for simultaneous treatments as well as overall wound healing status, it was determined that the patient would begin radiation therapy treatment for skin cancer.  A full simulation and treatment device design was performed including the determination and formulation of appropriate simple and complex devices including lead shield of 0.762 mm thickness to form molded customized shielding to specifically correlate with the lesion size including treatment margin.  The custom lead shield is adequate to accommodate the appropriate applicator and provide adequate shielding around the treatment site.  The specific field applicator, shields, and devices both simple and complex as well as the specific patient setup is outlined below.  The patient was given a full consent for superficial radiation to both verbally and in writing and the full determination of patient's eligibility for treatment and selection is outlined on the patient eligibility and treatment selection form.  The specific superficial radiotherapy prescription was determined and was documented on the superficial radiotherapy prescription form.  A treatment calculation was also performed and documented on the treatment calculation form.  Based on the prescription, the patient was scheduled for a series of fractional treatments.
Field Size (Applicator): 2.0 cm
Render Prescriptions In Note?: No
Total Number Of Fractions Rx 3: 15
Information: Selecting Yes will display possible errors in your note based on the variables you have selected. This validation is only offered as a suggestion for you. PLEASE NOTE THAT THE VALIDATION TEXT WILL BE REMOVED WHEN YOU FINALIZE YOUR NOTE. IF YOU WANT TO FAX A PRELIMINARY NOTE YOU WILL NEED TO TOGGLE THIS TO 'NO' IF YOU DO NOT WANT IT IN YOUR FAXED NOTE.
Daily Fractionated Dose (Optional- Include Units): 277.77 cGy
Total Dose (Optional-Please Include Units) Rx 2: 2742.60 cGy
Day Of The Week Treatment Administered: Thursday
Fractions / Week: 3
Treatment Margins In Cm: 0.6
Fractions / Week Rx 3: 5
Treatment Time / Fractionation (Optional- Include Units) Rx 2: 0.42 min
Cumulative Dose In Cgy (Optional): 3333.24
Energy (Optional-Please Include Units) Rx 2: 70 kV
Total Dose (Optional-Please Include Units): 5555.40 cGy
Dimensions-X Axis In Cm: 0.8
Energy (Optional-Please Include Units): 100 kV
Treatment Device Design After Initial Simulation Justification (Will Render If Bill For Treatment Devices = Yes): The patient is status post radiation simulation and is evaluated as to the use of additional devices for shielding and placement for radiation therapy.
Port Dimensions-X Axis In Cm: 0
Treatment Time In Min (Optional): 0.47
Time Dose Fractionation (Optional- Include Units If Applicable) Rx 2: 49
Field Size (Applicator) Rx 2: 4.0 cm
Fractionation Number: 12
Custom Shielding Preamble Text Will Not Be Included With Simple Simulations (.......... X X Y Cm): A lead shield of 0.762 mm thickness is utilized to form a molded, custom shield with a
Treatment Time / Fractionation (Optional- Include Units): 0.47 min
Comments: RTOG 2
Simple Simulation Preamble Text Will Be Included With Simple Simulations (.......... Indications): Simple simulation was performed today for the following reasons:
Fractionation Number (Evaluation): 10
Functional Status: 0 (fully active)
Time Dose Fractionation (Optional- Include Units If Applicable): 99
Assessment: Appropriate reaction
Custom Shielding Afterword Text Will Not Be Included With Simple Simulations (X X Y Cm............): port to correlate with the lesion size, including treatment margin. The custom lead shield is adequate to accommodate the appropriate applicator and provide adequate shielding around the treatment site. Additional shielding (as noted below) is used to protect sensitive, normal tissues.
Depth (Optional-Please Include Units): 2.02 mm
Patient Positioning: Supine
Dimensions-Y Axis In Cm: 0.7

## 2021-03-07 ENCOUNTER — APPOINTMENT (OUTPATIENT)
Dept: URBAN - METROPOLITAN AREA CLINIC 289 | Age: 66
Setting detail: DERMATOLOGY
End: 2021-03-11

## 2021-03-07 PROCEDURE — 77336 RADIATION PHYSICS CONSULT: CPT

## 2021-03-09 ENCOUNTER — APPOINTMENT (OUTPATIENT)
Dept: URBAN - METROPOLITAN AREA CLINIC 289 | Age: 66
Setting detail: DERMATOLOGY
End: 2021-03-09

## 2021-03-09 PROBLEM — C44.311 BASAL CELL CARCINOMA OF SKIN OF NOSE: Status: ACTIVE | Noted: 2021-03-09

## 2021-03-09 PROCEDURE — OTHER SUPERFICIAL RADIATION TREATMENT: OTHER

## 2021-03-09 PROCEDURE — 77401 RADIATION TX DELIVERY SUPFC: CPT

## 2021-03-09 PROCEDURE — G6001 ECHO GUIDANCE RADIOTHERAPY: HCPCS

## 2021-03-09 PROCEDURE — OTHER TREATMENT REGIMEN: OTHER

## 2021-03-09 PROCEDURE — OTHER FOLLOW UP FOR NEXT VISIT: OTHER

## 2021-03-09 PROCEDURE — 77280 THER RAD SIMULAJ FIELD SMPL: CPT

## 2021-03-09 NOTE — PROCEDURE: TREATMENT REGIMEN

## 2021-03-09 NOTE — PROCEDURE: SUPERFICIAL RADIATION TREATMENT
Computed Treatment Time In Min (Will Render The Same As Calculated Treatment Time If Left Blank): 0.47
Depth (Optional-Please Include Units): 2.02 mm
Patient Positioning: Supine
Detail Level: Detailed
Total Number Of Fractions Rx 3: 15
Port Dimensions-X Axis In Cm: 0
Additional Prescription Justification Text: If there is any interruption in treatment exceeding 5 days please see Decay and Dose Adjustment Calculation and complete treatment under Prescription 2, 3 or 4 as appropriate.
Daily Fractionated Dose (Optional- Include Units) Rx 2: 274.26 cGy
Field Size (Applicator): 2.0 cm
Dose Per Fractionation In Cgy (Optional): 277.77
Bill For Treatment Devices Only: No
Fractionation Number: 13
Energy (Include Units): 100 kV
Total Dose (Optional-Please Include Units): 5555.40 cGy
Simple Simulation Afterword Text Will Be Included With Simple Simulations (Indications............): The patient had a complete consultation regarding all applicable modalities for the treatment of their skin cancer and based on a variety of factors including the type of tumor, size, and location, the relevant medical history as well as local tissue factors, the functional status of the individual, the ability to perform necessary postoperative wound instructions and the need for simultaneous treatments as well as overall wound healing status, it was determined that the patient would begin radiation therapy treatment for skin cancer.  A full simulation and treatment device design was performed including the determination and formulation of appropriate simple and complex devices including lead shield of 0.762 mm thickness to form molded customized shielding to specifically correlate with the lesion size including treatment margin.  The custom lead shield is adequate to accommodate the appropriate applicator and provide adequate shielding around the treatment site.  The specific field applicator, shields, and devices both simple and complex as well as the specific patient setup is outlined below.  The patient was given a full consent for superficial radiation to both verbally and in writing and the full determination of patient's eligibility for treatment and selection is outlined on the patient eligibility and treatment selection form.  The specific superficial radiotherapy prescription was determined and was documented on the superficial radiotherapy prescription form.  A treatment calculation was also performed and documented on the treatment calculation form.  Based on the prescription, the patient was scheduled for a series of fractional treatments.
Dimensions-X Axis In Cm: 0.8
Validate Note Data (See Information Below): Yes
Functional Status: 0 (fully active)
Number Of Treatment Days: 1
Comments: RTOG 2
Fractions / Week Rx 2: 3
Fractionation Number (Evaluation): 10
Treatment Time / Fractionation (Optional- Include Units): 0.47 min
Field Size (Applicator) Rx 2: 4.0 cm
Dimensions-Y Axis In Cm: 0.7
Energy (Optional-Please Include Units) Rx 2: 70 kV
Total Dose (Optional-Please Include Units) Rx 2: 2742.60 cGy
Day Of The Week Treatment Administered: Tuesday
Treatment Time / Fractionation (Optional- Include Units) Rx 2: 0.42 min
Assessment: Appropriate reaction
Time Dose Fractionation (Optional- Include Units If Applicable): 99
Custom Shielding Preamble Text Will Not Be Included With Simple Simulations (.......... X X Y Cm): A lead shield of 0.762 mm thickness is utilized to form a molded, custom shield with a
Fractions / Week Rx 4: 5
Treatment Device Design After Initial Simulation Justification (Will Render If Bill For Treatment Devices = Yes): The patient is status post radiation simulation and is evaluated as to the use of additional devices for shielding and placement for radiation therapy.
Time Dose Fractionation (Optional- Include Units If Applicable) Rx 2: 49
Intro Statement (Will Not Render If Left Blank): The patient is undergoing superficial radiation therapy for skin cancer and presents for weekly evaluation and management. Per protocol and as documented on the flow sheet, the patient was questioned as to subjective redness, pruritus, pain, drainage, fatigue, or any other symptoms. Objectively, the radiation area was evaluated with regards to erythema, atrophy, scale, crusting, erosion, ulceration, edema, purpura, tenderness, warmth, drainage, and any other findings. The plan was extensively reviewed including the dose, and dosing schedule. The simulation and clinical setup was also reviewed as was the external and any internal shields and based on this review the appropriateness and sufficiency of treatment was determined.
Total Number Of Fractions: 20
Treatment Margins In Cm: 0.6
Simple Simulation Preamble Text Will Be Included With Simple Simulations (.......... Indications): Simple simulation was performed today for the following reasons:
Information: Selecting Yes will display possible errors in your note based on the variables you have selected. This validation is only offered as a suggestion for you. PLEASE NOTE THAT THE VALIDATION TEXT WILL BE REMOVED WHEN YOU FINALIZE YOUR NOTE. IF YOU WANT TO FAX A PRELIMINARY NOTE YOU WILL NEED TO TOGGLE THIS TO 'NO' IF YOU DO NOT WANT IT IN YOUR FAXED NOTE.
Cumulative Dose In Cgy (Optional): 3611.01
Shielding Size (Optional- Include Units) Rx 2: 2.2 x 3.3 cm
Daily Fractionated Dose (Optional- Include Units): 277.77 cGy
Custom Shielding Afterword Text Will Not Be Included With Simple Simulations (X X Y Cm............): port to correlate with the lesion size, including treatment margin. The custom lead shield is adequate to accommodate the appropriate applicator and provide adequate shielding around the treatment site. Additional shielding (as noted below) is used to protect sensitive, normal tissues.

## 2021-03-15 ENCOUNTER — APPOINTMENT (OUTPATIENT)
Dept: URBAN - METROPOLITAN AREA CLINIC 289 | Age: 66
Setting detail: DERMATOLOGY
End: 2021-03-16

## 2021-03-15 PROBLEM — C44.311 BASAL CELL CARCINOMA OF SKIN OF NOSE: Status: ACTIVE | Noted: 2021-03-15

## 2021-03-15 PROCEDURE — 77280 THER RAD SIMULAJ FIELD SMPL: CPT

## 2021-03-15 PROCEDURE — OTHER FOLLOW UP FOR NEXT VISIT: OTHER

## 2021-03-15 PROCEDURE — G6001 ECHO GUIDANCE RADIOTHERAPY: HCPCS

## 2021-03-15 PROCEDURE — OTHER SUPERFICIAL RADIATION TREATMENT: OTHER

## 2021-03-15 PROCEDURE — 77401 RADIATION TX DELIVERY SUPFC: CPT

## 2021-03-15 PROCEDURE — OTHER TREATMENT REGIMEN: OTHER

## 2021-03-15 NOTE — PROCEDURE: TREATMENT REGIMEN
Detail Level: Detailed
Plan: This patient has been treated today with image guided superficial radiation therapy for non-melanoma skin cancer.\\n\\nWritten informed consent has been previously obtained from this patient for this treatment. This consent is documented in the patient’s chart. The patient gave verbal consent to continue treatment today.\\n\\nThe patient was treated with a specific radiation dose and setup as prescribed by the provider listed on this visit note. A Radiation Therapist performed administration of radiation under supervision of provider. The treatment parameters and cumulative dose are indicated above.\\n\\nPrior to administering the radiation, the patient underwent a verification therapeutic radiology simulation-aided field setting defining relevant normal and abnormal target anatomy and acquiring images with high frequency ultrasound in addition to data necessary developing optimal radiation treatment process for the patient. This process includes verification of the treatment port(s) and proper treatment positioning. All treatment ports were photographed within electronic medical record. The patient’s customized lead blocking along with gross tumor volume and margin was confirmed. Considering superficial radiotherapy is clinical in setup, this requires physician and radiation therapist to clarify location interest being treated against initial images, pathology and patient anatomy. Care was taken ensuring fields treated were geometrically accurate and properly positioned using therapeutic radiology simulation-aided field setting verification per fraction. This process is also utilized to determine if any prescription or setup changes are necessary. These steps are therefore medically necessary ensuring safe and effective administration of radiation. Ongoing therapeutic radiology simulation-aided field setting verification is ordered throughout course of therapy.\\n\\nA high frequency ultrasound image was acquired today for two-dimensional evaluation of the tumor volume and response to treatment, in addition to geometric accuracy of field placement. US depth is 2.33 mm, which is 0.16 mm in difference from previous imaging. The field placement and ultrasound imaging, per fraction, is separate and distinct from the initial simulation, and is an important task in providing safe administration of superficial radiation therapy. Physician evaluation of the ultrasound tumor depth will be ongoing throughout the course of treatment and is deemed medically necessary in order to ensure the efficacy of treatment and any necessary changes. Today’s image was evaluated for determination of continuation of treatment with the current plan or with necessary changes as appropriate. According to provider review of verification, therapeutic radiology simulation-aided field setting and imaging, no change is required. Additionally the use of ultrasound visualization and targeted assessment allows the patient to be able to see their cancer(s) progress, encouraging patient to complete and maintain compliance through full course of radiotherapy.\\n\\nPer Dr. Cano, continued ultrasound guidance and therapeutic radiology simulation-aided field setting verification per fraction is required for field placement, measurement of tumor depth, progress and acute effect monitoring.

## 2021-03-15 NOTE — PROCEDURE: SUPERFICIAL RADIATION TREATMENT
Cumulative Dose In Cgy (Optional): 3888.78
Total Dose (Optional-Please Include Units): 5555.40 cGy
Fractions / Week Rx 4: 5
Time Dose Fractionation (Optional- Include Units If Applicable) Rx 2: 49
Functional Status: 0 (fully active)
Comments: RTOG 2
Energy (Include Units): 100 kV
Dimensions-X Axis In Cm: 0.8
Fractionation Number: 14
Total Number Of Fractions: 20
Field Size (Applicator): 2.0 cm
Render Additional Prescriptions In Note?: No
Port Dimensions-X Axis In Cm: 0
Validate Note Data (See Information Below): Yes
Fractions / Week: 3
Number Of Days Off Treatment: 1
Day Of The Week Treatment Administered: Monday
Custom Shielding Afterword Text Will Not Be Included With Simple Simulations (X X Y Cm............): port to correlate with the lesion size, including treatment margin. The custom lead shield is adequate to accommodate the appropriate applicator and provide adequate shielding around the treatment site. Additional shielding (as noted below) is used to protect sensitive, normal tissues.
Fractionation Number (Evaluation): 10
Treatment Margins In Cm: 0.6
Energy (Optional-Please Include Units) Rx 2: 70 kV
Depth (Optional-Please Include Units): 2.02 mm
Detail Level: Detailed
Information: Selecting Yes will display possible errors in your note based on the variables you have selected. This validation is only offered as a suggestion for you. PLEASE NOTE THAT THE VALIDATION TEXT WILL BE REMOVED WHEN YOU FINALIZE YOUR NOTE. IF YOU WANT TO FAX A PRELIMINARY NOTE YOU WILL NEED TO TOGGLE THIS TO 'NO' IF YOU DO NOT WANT IT IN YOUR FAXED NOTE.
Simple Simulation Afterword Text Will Be Included With Simple Simulations (Indications............): The patient had a complete consultation regarding all applicable modalities for the treatment of their skin cancer and based on a variety of factors including the type of tumor, size, and location, the relevant medical history as well as local tissue factors, the functional status of the individual, the ability to perform necessary postoperative wound instructions and the need for simultaneous treatments as well as overall wound healing status, it was determined that the patient would begin radiation therapy treatment for skin cancer.  A full simulation and treatment device design was performed including the determination and formulation of appropriate simple and complex devices including lead shield of 0.762 mm thickness to form molded customized shielding to specifically correlate with the lesion size including treatment margin.  The custom lead shield is adequate to accommodate the appropriate applicator and provide adequate shielding around the treatment site.  The specific field applicator, shields, and devices both simple and complex as well as the specific patient setup is outlined below.  The patient was given a full consent for superficial radiation to both verbally and in writing and the full determination of patient's eligibility for treatment and selection is outlined on the patient eligibility and treatment selection form.  The specific superficial radiotherapy prescription was determined and was documented on the superficial radiotherapy prescription form.  A treatment calculation was also performed and documented on the treatment calculation form.  Based on the prescription, the patient was scheduled for a series of fractional treatments.
Assessment: Appropriate reaction
Custom Shielding Preamble Text Will Not Be Included With Simple Simulations (.......... X X Y Cm): A lead shield of 0.762 mm thickness is utilized to form a molded, custom shield with a
Daily Fractionated Dose (Optional- Include Units) Rx 2: 274.26 cGy
Shielding Size (Optional- Include Units) Rx 2: 2.2 x 3.3 cm
Field Size (Applicator) Rx 2: 4.0 cm
Treatment Time / Fractionation (Optional- Include Units) Rx 2: 0.42 min
Time Dose Fractionation (Optional- Include Units If Applicable): 99
Treatment Time In Min (Optional): 0.47
Simple Simulation Preamble Text Will Be Included With Simple Simulations (.......... Indications): Simple simulation was performed today for the following reasons:
Additional Prescription Justification Text: If there is any interruption in treatment exceeding 5 days please see Decay and Dose Adjustment Calculation and complete treatment under Prescription 2, 3 or 4 as appropriate.
Total Dose (Optional-Please Include Units) Rx 2: 2742.60 cGy
Total Number Of Fractions Rx 4: 15
Dose Per Fractionation In Cgy (Optional): 277.77
Daily Fractionated Dose (Optional- Include Units): 277.77 cGy
Treatment Device Design After Initial Simulation Justification (Will Render If Bill For Treatment Devices = Yes): The patient is status post radiation simulation and is evaluated as to the use of additional devices for shielding and placement for radiation therapy.
Patient Positioning: Supine
Intro Statement (Will Not Render If Left Blank): The patient is undergoing superficial radiation therapy for skin cancer and presents for weekly evaluation and management. Per protocol and as documented on the flow sheet, the patient was questioned as to subjective redness, pruritus, pain, drainage, fatigue, or any other symptoms. Objectively, the radiation area was evaluated with regards to erythema, atrophy, scale, crusting, erosion, ulceration, edema, purpura, tenderness, warmth, drainage, and any other findings. The plan was extensively reviewed including the dose, and dosing schedule. The simulation and clinical setup was also reviewed as was the external and any internal shields and based on this review the appropriateness and sufficiency of treatment was determined.
Treatment Time / Fractionation (Optional- Include Units): 0.47 min
Dimensions-Y Axis In Cm: 0.7

## 2021-03-16 ENCOUNTER — APPOINTMENT (OUTPATIENT)
Dept: URBAN - METROPOLITAN AREA CLINIC 289 | Age: 66
Setting detail: DERMATOLOGY
End: 2021-03-17

## 2021-03-16 PROBLEM — C44.311 BASAL CELL CARCINOMA OF SKIN OF NOSE: Status: ACTIVE | Noted: 2021-03-16

## 2021-03-16 PROCEDURE — OTHER FOLLOW UP FOR NEXT VISIT: OTHER

## 2021-03-16 PROCEDURE — G6001 ECHO GUIDANCE RADIOTHERAPY: HCPCS

## 2021-03-16 PROCEDURE — 77280 THER RAD SIMULAJ FIELD SMPL: CPT

## 2021-03-16 PROCEDURE — OTHER SUPERFICIAL RADIATION TREATMENT: OTHER

## 2021-03-16 PROCEDURE — 77401 RADIATION TX DELIVERY SUPFC: CPT

## 2021-03-16 PROCEDURE — OTHER TREATMENT REGIMEN: OTHER

## 2021-03-16 NOTE — PROCEDURE: TREATMENT REGIMEN

## 2021-03-16 NOTE — PROCEDURE: SUPERFICIAL RADIATION TREATMENT
Bill For Radiation Treatment: Yes
Depth (Optional-Please Include Units): 2.02 mm
Fractionation Number (Evaluation): 10
Port Dimensions-X Axis In Cm: 0
Treatment Time / Fractionation (Optional- Include Units) Rx 2: 0.42 min
Render Patient Eligibility And Selection In Note?: No
Fractions / Week: 3
Dose / Tx In Cgy (Optional): 277.77
Daily Fractionated Dose (Optional- Include Units): 277.77 cGy
Day Of The Week Treatment Administered: Tuesday
Prescription Used: 1
Field Size (Applicator) Rx 2: 4.0 cm
Fractions / Week Rx 4: 5
Time Dose Fractionation (Optional- Include Units If Applicable) Rx 2: 49
Dimensions-X Axis In Cm: 0.8
Assessment: Appropriate reaction
Total Dose (Optional-Please Include Units): 5555.40 cGy
Functional Status: 0 (fully active)
Simple Simulation Afterword Text Will Be Included With Simple Simulations (Indications............): The patient had a complete consultation regarding all applicable modalities for the treatment of their skin cancer and based on a variety of factors including the type of tumor, size, and location, the relevant medical history as well as local tissue factors, the functional status of the individual, the ability to perform necessary postoperative wound instructions and the need for simultaneous treatments as well as overall wound healing status, it was determined that the patient would begin radiation therapy treatment for skin cancer.  A full simulation and treatment device design was performed including the determination and formulation of appropriate simple and complex devices including lead shield of 0.762 mm thickness to form molded customized shielding to specifically correlate with the lesion size including treatment margin.  The custom lead shield is adequate to accommodate the appropriate applicator and provide adequate shielding around the treatment site.  The specific field applicator, shields, and devices both simple and complex as well as the specific patient setup is outlined below.  The patient was given a full consent for superficial radiation to both verbally and in writing and the full determination of patient's eligibility for treatment and selection is outlined on the patient eligibility and treatment selection form.  The specific superficial radiotherapy prescription was determined and was documented on the superficial radiotherapy prescription form.  A treatment calculation was also performed and documented on the treatment calculation form.  Based on the prescription, the patient was scheduled for a series of fractional treatments.
Custom Shielding Preamble Text Will Not Be Included With Simple Simulations (.......... X X Y Cm): A lead shield of 0.762 mm thickness is utilized to form a molded, custom shield with a
Additional Prescription Justification Text: If there is any interruption in treatment exceeding 5 days please see Decay and Dose Adjustment Calculation and complete treatment under Prescription 2, 3 or 4 as appropriate.
Energy (Optional-Please Include Units): 100 kV
Treatment Time / Fractionation (Optional- Include Units): 0.47 min
Comments: RTOG 2
Field Size (Applicator): 2.0 cm
Treatment Device Design After Initial Simulation Justification (Will Render If Bill For Treatment Devices = Yes): The patient is status post radiation simulation and is evaluated as to the use of additional devices for shielding and placement for radiation therapy.
Shielding Size (Optional- Include Units) Rx 2: 2.2 x 3.3 cm
Detail Level: Detailed
Patient Positioning: Supine
Dimensions-Y Axis In Cm: 0.7
Cumulative Dose In Cgy (Optional): 4166.55
Treatment Margins In Cm: 0.6
Total Number Of Fractions Rx 3: 15
Daily Fractionated Dose (Optional- Include Units) Rx 2: 274.26 cGy
Simple Simulation Preamble Text Will Be Included With Simple Simulations (.......... Indications): Simple simulation was performed today for the following reasons:
Treatment Time In Min (Optional): 0.47
Custom Shielding Afterword Text Will Not Be Included With Simple Simulations (X X Y Cm............): port to correlate with the lesion size, including treatment margin. The custom lead shield is adequate to accommodate the appropriate applicator and provide adequate shielding around the treatment site. Additional shielding (as noted below) is used to protect sensitive, normal tissues.
Information: Selecting Yes will display possible errors in your note based on the variables you have selected. This validation is only offered as a suggestion for you. PLEASE NOTE THAT THE VALIDATION TEXT WILL BE REMOVED WHEN YOU FINALIZE YOUR NOTE. IF YOU WANT TO FAX A PRELIMINARY NOTE YOU WILL NEED TO TOGGLE THIS TO 'NO' IF YOU DO NOT WANT IT IN YOUR FAXED NOTE.
Total Number Of Fractions: 20
Total Dose (Optional-Please Include Units) Rx 2: 2742.60 cGy
Energy (Optional-Please Include Units) Rx 2: 70 kV
Intro Statement (Will Not Render If Left Blank): The patient is undergoing superficial radiation therapy for skin cancer and presents for weekly evaluation and management. Per protocol and as documented on the flow sheet, the patient was questioned as to subjective redness, pruritus, pain, drainage, fatigue, or any other symptoms. Objectively, the radiation area was evaluated with regards to erythema, atrophy, scale, crusting, erosion, ulceration, edema, purpura, tenderness, warmth, drainage, and any other findings. The plan was extensively reviewed including the dose, and dosing schedule. The simulation and clinical setup was also reviewed as was the external and any internal shields and based on this review the appropriateness and sufficiency of treatment was determined.
Time Dose Fractionation (Optional- Include Units If Applicable): 99

## 2021-03-17 ENCOUNTER — APPOINTMENT (OUTPATIENT)
Dept: URBAN - METROPOLITAN AREA CLINIC 289 | Age: 66
Setting detail: DERMATOLOGY
End: 2021-03-18

## 2021-03-17 PROBLEM — C44.311 BASAL CELL CARCINOMA OF SKIN OF NOSE: Status: ACTIVE | Noted: 2021-03-17

## 2021-03-17 PROCEDURE — 77280 THER RAD SIMULAJ FIELD SMPL: CPT

## 2021-03-17 PROCEDURE — G6001 ECHO GUIDANCE RADIOTHERAPY: HCPCS

## 2021-03-17 PROCEDURE — OTHER SUPERFICIAL RADIATION TREATMENT: OTHER

## 2021-03-17 PROCEDURE — OTHER TREATMENT REGIMEN: OTHER

## 2021-03-17 PROCEDURE — 77427 RADIATION TX MANAGEMENT X5: CPT

## 2021-03-17 PROCEDURE — 77401 RADIATION TX DELIVERY SUPFC: CPT

## 2021-03-17 PROCEDURE — OTHER FOLLOW UP FOR NEXT VISIT: OTHER

## 2021-03-17 NOTE — PROCEDURE: TREATMENT REGIMEN

## 2021-03-18 ENCOUNTER — APPOINTMENT (OUTPATIENT)
Dept: URBAN - METROPOLITAN AREA CLINIC 289 | Age: 66
Setting detail: DERMATOLOGY
End: 2021-03-18

## 2021-03-18 PROBLEM — C44.311 BASAL CELL CARCINOMA OF SKIN OF NOSE: Status: ACTIVE | Noted: 2021-03-18

## 2021-03-18 PROCEDURE — G6001 ECHO GUIDANCE RADIOTHERAPY: HCPCS

## 2021-03-18 PROCEDURE — 77280 THER RAD SIMULAJ FIELD SMPL: CPT

## 2021-03-18 PROCEDURE — 77401 RADIATION TX DELIVERY SUPFC: CPT

## 2021-03-18 PROCEDURE — OTHER TREATMENT REGIMEN: OTHER

## 2021-03-18 PROCEDURE — OTHER SUPERFICIAL RADIATION TREATMENT: OTHER

## 2021-03-18 PROCEDURE — OTHER FOLLOW UP FOR NEXT VISIT: OTHER

## 2021-03-18 NOTE — PROCEDURE: TREATMENT REGIMEN

## 2021-03-18 NOTE — PROCEDURE: SUPERFICIAL RADIATION TREATMENT
Daily Fractionated Dose (Optional- Include Units): 277.77 cGy
Treatment Margins In Cm: 0.6
Shielding Size (Optional- Include Units) Rx 2: 2.2 x 3.3 cm
Assessment: Appropriate reaction
Render Patient Eligibility And Selection In Note?: No
Day Of The Week Treatment Administered: Thursday
Fractionation Number (Evaluation): 10
Number Of Days Off Treatment: 1
Simple Simulation Afterword Text Will Be Included With Simple Simulations (Indications............): The patient had a complete consultation regarding all applicable modalities for the treatment of their skin cancer and based on a variety of factors including the type of tumor, size, and location, the relevant medical history as well as local tissue factors, the functional status of the individual, the ability to perform necessary postoperative wound instructions and the need for simultaneous treatments as well as overall wound healing status, it was determined that the patient would begin radiation therapy treatment for skin cancer.  A full simulation and treatment device design was performed including the determination and formulation of appropriate simple and complex devices including lead shield of 0.762 mm thickness to form molded customized shielding to specifically correlate with the lesion size including treatment margin.  The custom lead shield is adequate to accommodate the appropriate applicator and provide adequate shielding around the treatment site.  The specific field applicator, shields, and devices both simple and complex as well as the specific patient setup is outlined below.  The patient was given a full consent for superficial radiation to both verbally and in writing and the full determination of patient's eligibility for treatment and selection is outlined on the patient eligibility and treatment selection form.  The specific superficial radiotherapy prescription was determined and was documented on the superficial radiotherapy prescription form.  A treatment calculation was also performed and documented on the treatment calculation form.  Based on the prescription, the patient was scheduled for a series of fractional treatments.
Dimensions-X Axis In Cm: 0.8
Port Dimensions-X Axis In Cm: 0
Daily Fractionated Dose (Optional- Include Units) Rx 2: 274.26 cGy
Fractions / Week Rx 3: 5
Dose / Tx In Cgy (Optional): 277.77
Fractions / Week: 3
Intro Statement (Will Not Render If Left Blank): The patient is undergoing superficial radiation therapy for skin cancer and presents for weekly evaluation and management. Per protocol and as documented on the flow sheet, the patient was questioned as to subjective redness, pruritus, pain, drainage, fatigue, or any other symptoms. Objectively, the radiation area was evaluated with regards to erythema, atrophy, scale, crusting, erosion, ulceration, edema, purpura, tenderness, warmth, drainage, and any other findings. The plan was extensively reviewed including the dose, and dosing schedule. The simulation and clinical setup was also reviewed as was the external and any internal shields and based on this review the appropriateness and sufficiency of treatment was determined.
Field Size (Applicator): 2.0 cm
Energy (Optional-Please Include Units): 100 kV
Information: Selecting Yes will display possible errors in your note based on the variables you have selected. This validation is only offered as a suggestion for you. PLEASE NOTE THAT THE VALIDATION TEXT WILL BE REMOVED WHEN YOU FINALIZE YOUR NOTE. IF YOU WANT TO FAX A PRELIMINARY NOTE YOU WILL NEED TO TOGGLE THIS TO 'NO' IF YOU DO NOT WANT IT IN YOUR FAXED NOTE.
Total Dose (Optional-Please Include Units): 5555.40 cGy
Field Size (Applicator) Rx 2: 4.0 cm
Functional Status: 0 (fully active)
Treatment Time / Fractionation (Optional- Include Units): 0.47 min
Custom Shielding Preamble Text Will Not Be Included With Simple Simulations (.......... X X Y Cm): A lead shield of 0.762 mm thickness is utilized to form a molded, custom shield with a
Total Dose (Optional-Please Include Units) Rx 2: 2742.60 cGy
Energy (Optional-Please Include Units) Rx 2: 70 kV
Dimensions-Y Axis In Cm: 0.7
Treatment Device Design After Initial Simulation Justification (Will Render If Bill For Treatment Devices = Yes): The patient is status post radiation simulation and is evaluated as to the use of additional devices for shielding and placement for radiation therapy.
Validate Note Data (See Information Below): Yes
Treatment Time / Fractionation (Optional- Include Units) Rx 2: 0.42 min
Time Dose Fractionation (Optional- Include Units If Applicable): 99
Cumulative Dose In Cgy (Optional): 4722.09
Detail Level: Detailed
Treatment Time In Min (Optional): 0.47
Simple Simulation Preamble Text Will Be Included With Simple Simulations (.......... Indications): Simple simulation was performed today for the following reasons:
Patient Positioning: Supine
Fractionation Number: 17
Time Dose Fractionation (Optional- Include Units If Applicable) Rx 2: 49
Depth (Optional-Please Include Units): 2.02 mm
Comments: RTOG 2
Additional Prescription Justification Text: If there is any interruption in treatment exceeding 5 days please see Decay and Dose Adjustment Calculation and complete treatment under Prescription 2, 3 or 4 as appropriate.
Total Number Of Fractions Rx 3: 15
Total Number Of Fractions: 20
Custom Shielding Afterword Text Will Not Be Included With Simple Simulations (X X Y Cm............): port to correlate with the lesion size, including treatment margin. The custom lead shield is adequate to accommodate the appropriate applicator and provide adequate shielding around the treatment site. Additional shielding (as noted below) is used to protect sensitive, normal tissues.

## 2021-03-20 ENCOUNTER — APPOINTMENT (OUTPATIENT)
Dept: URBAN - METROPOLITAN AREA CLINIC 289 | Age: 66
Setting detail: DERMATOLOGY
End: 2021-03-30

## 2021-03-20 PROCEDURE — 77336 RADIATION PHYSICS CONSULT: CPT

## 2021-03-23 ENCOUNTER — APPOINTMENT (OUTPATIENT)
Dept: URBAN - METROPOLITAN AREA CLINIC 289 | Age: 66
Setting detail: DERMATOLOGY
End: 2021-03-23

## 2021-03-23 PROBLEM — C44.311 BASAL CELL CARCINOMA OF SKIN OF NOSE: Status: ACTIVE | Noted: 2021-03-23

## 2021-03-23 PROCEDURE — OTHER SUPERFICIAL RADIATION TREATMENT: OTHER

## 2021-03-23 PROCEDURE — 77401 RADIATION TX DELIVERY SUPFC: CPT

## 2021-03-23 PROCEDURE — OTHER TREATMENT REGIMEN: OTHER

## 2021-03-23 PROCEDURE — 77280 THER RAD SIMULAJ FIELD SMPL: CPT

## 2021-03-23 PROCEDURE — G6001 ECHO GUIDANCE RADIOTHERAPY: HCPCS

## 2021-03-23 PROCEDURE — OTHER FOLLOW UP FOR NEXT VISIT: OTHER

## 2021-03-23 NOTE — PROCEDURE: TREATMENT REGIMEN
Detail Level: Detailed
Plan: This patient has been treated today with image guided superficial radiation therapy for non-melanoma skin cancer.\\n\\nWritten informed consent has been previously obtained from this patient for this treatment. This consent is documented in the patient’s chart. The patient gave verbal consent to continue treatment today.\\n\\nThe patient was treated with a specific radiation dose and setup as prescribed by the provider listed on this visit note. A Radiation Therapist performed administration of radiation under supervision of provider. The treatment parameters and cumulative dose are indicated above.\\n\\nPrior to administering the radiation, the patient underwent a verification therapeutic radiology simulation-aided field setting defining relevant normal and abnormal target anatomy and acquiring images with high frequency ultrasound in addition to data necessary developing optimal radiation treatment process for the patient. This process includes verification of the treatment port(s) and proper treatment positioning. All treatment ports were photographed within electronic medical record. The patient’s customized lead blocking along with gross tumor volume and margin was confirmed. Considering superficial radiotherapy is clinical in setup, this requires physician and radiation therapist to clarify location interest being treated against initial images, pathology and patient anatomy. Care was taken ensuring fields treated were geometrically accurate and properly positioned using therapeutic radiology simulation-aided field setting verification per fraction. This process is also utilized to determine if any prescription or setup changes are necessary. These steps are therefore medically necessary ensuring safe and effective administration of radiation. Ongoing therapeutic radiology simulation-aided field setting verification is ordered throughout course of therapy.\\n\\nA high frequency ultrasound image was acquired today for two-dimensional evaluation of the tumor volume and response to treatment, in addition to geometric accuracy of field placement. US depth is 2.60 mm, which is 0.19 mm in difference from previous imaging. The field placement and ultrasound imaging, per fraction, is separate and distinct from the initial simulation, and is an important task in providing safe administration of superficial radiation therapy. Physician evaluation of the ultrasound tumor depth will be ongoing throughout the course of treatment and is deemed medically necessary in order to ensure the efficacy of treatment and any necessary changes. Today’s image was evaluated for determination of continuation of treatment with the current plan or with necessary changes as appropriate. According to provider review of verification, therapeutic radiology simulation-aided field setting and imaging, no change is required. Additionally the use of ultrasound visualization and targeted assessment allows the patient to be able to see their cancer(s) progress, encouraging patient to complete and maintain compliance through full course of radiotherapy.\\n\\nPer Dr. Cano, continued ultrasound guidance and therapeutic radiology simulation-aided field setting verification per fraction is required for field placement, measurement of tumor depth, progress and acute effect monitoring.

## 2021-03-23 NOTE — PROCEDURE: SUPERFICIAL RADIATION TREATMENT
Functional Status: 0 (fully active)
Field Size (Applicator): 2.0 cm
Total Dose (Optional-Please Include Units): 5555.40 cGy
Computed Treatment Time In Min (Will Render The Same As Calculated Treatment Time If Left Blank): 0.47
Bill For Radiation Treatment: Yes
Assessment: Appropriate reaction
Patient Positioning: Supine
Field Number: 1
Fractions / Week Rx 2: 3
Agustin For Simulation Without Treatment Device Design (Simple Simulation): No
Total Number Of Fractions Rx 4: 15
Simple Simulation Preamble Text Will Be Included With Simple Simulations (.......... Indications): Simple simulation was performed today for the following reasons:
Shielding Size (Optional- Include Units) Rx 2: 2.2 x 3.3 cm
Comments: RTOG 2
Dose / Tx In Cgy (Optional): 277.77
Initial Radiation Treatment Planning (Will Render If Bill Simulation = Yes): The patient had a complete consultation regarding all applicable modalities for the treatment of their skin cancer and based on a variety of factors including the type of tumor, size, and location, the relevant medical history as well as local tissue factors, the functional status of the individual, the ability to perform necessary postoperative wound instructions and the need for simultaneous treatments as well as overall wound healing status, it was determined that the patient would begin radiation therapy treatment for skin cancer.  A full simulation and treatment device design was performed including the determination and formulation of appropriate simple and complex devices including lead shield of 0.762 mm thickness to form molded customized shielding to specifically correlate with the lesion size including treatment margin.  The custom lead shield is adequate to accommodate the appropriate applicator and provide adequate shielding around the treatment site.  The specific field applicator, shields, and devices both simple and complex as well as the specific patient setup is outlined below.  The patient was given a full consent for superficial radiation to both verbally and in writing and the full determination of patient's eligibility for treatment and selection is outlined on the patient eligibility and treatment selection form.  The specific superficial radiotherapy prescription was determined and was documented on the superficial radiotherapy prescription form.  A treatment calculation was also performed and documented on the treatment calculation form.  Based on the prescription, the patient was scheduled for a series of fractional treatments.
Treatment Margins In Cm: 0.6
Energy (Include Units): 100 kV
Port Dimensions-Y Axis In Cm: 0
Time Dose Fractionation (Optional- Include Units If Applicable): 99
Intro Statement (Will Not Render If Left Blank): The patient is undergoing superficial radiation therapy for skin cancer and presents for weekly evaluation and management. Per protocol and as documented on the flow sheet, the patient was questioned as to subjective redness, pruritus, pain, drainage, fatigue, or any other symptoms. Objectively, the radiation area was evaluated with regards to erythema, atrophy, scale, crusting, erosion, ulceration, edema, purpura, tenderness, warmth, drainage, and any other findings. The plan was extensively reviewed including the dose, and dosing schedule. The simulation and clinical setup was also reviewed as was the external and any internal shields and based on this review the appropriateness and sufficiency of treatment was determined.
Additional Prescription Justification Text: If there is any interruption in treatment exceeding 5 days please see Decay and Dose Adjustment Calculation and complete treatment under Prescription 2, 3 or 4 as appropriate.
Daily Fractionated Dose (Optional- Include Units) Rx 2: 274.26 cGy
Time Dose Fractionation (Optional- Include Units If Applicable) Rx 2: 49
Dimensions-X Axis In Cm: 0.8
Information: Selecting Yes will display possible errors in your note based on the variables you have selected. This validation is only offered as a suggestion for you. PLEASE NOTE THAT THE VALIDATION TEXT WILL BE REMOVED WHEN YOU FINALIZE YOUR NOTE. IF YOU WANT TO FAX A PRELIMINARY NOTE YOU WILL NEED TO TOGGLE THIS TO 'NO' IF YOU DO NOT WANT IT IN YOUR FAXED NOTE.
Day Of The Week Treatment Administered: Tuesday
Energy (Optional-Please Include Units) Rx 2: 70 kV
Fractions / Week Rx 4: 5
Cumulative Dose In Cgy (Optional): 4999.86
Daily Fractionated Dose (Optional- Include Units): 277.77 cGy
Treatment Device Design After Initial Simulation Justification (Will Render If Bill For Treatment Devices = Yes): The patient is status post radiation simulation and is evaluated as to the use of additional devices for shielding and placement for radiation therapy.
Total Dose (Optional-Please Include Units) Rx 2: 2742.60 cGy
Fractionation Number (Evaluation): 10
Treatment Time / Fractionation (Optional- Include Units): 0.47 min
Field Size (Applicator) Rx 2: 4.0 cm
Fractionation Number: 18
Total Number Of Fractions: 20
Custom Shielding Preamble Text Will Not Be Included With Simple Simulations (.......... X X Y Cm): A lead shield of 0.762 mm thickness is utilized to form a molded, custom shield with a
Custom Shielding Afterword Text Will Not Be Included With Simple Simulations (X X Y Cm............): port to correlate with the lesion size, including treatment margin. The custom lead shield is adequate to accommodate the appropriate applicator and provide adequate shielding around the treatment site. Additional shielding (as noted below) is used to protect sensitive, normal tissues.
Treatment Time / Fractionation (Optional- Include Units) Rx 2: 0.42 min
Detail Level: Detailed
Dimensions-Y Axis In Cm: 0.7
Depth (Optional-Please Include Units): 2.02 mm

## 2021-03-25 ENCOUNTER — APPOINTMENT (OUTPATIENT)
Dept: URBAN - METROPOLITAN AREA CLINIC 289 | Age: 66
Setting detail: DERMATOLOGY
End: 2021-03-25

## 2021-03-25 PROBLEM — C44.311 BASAL CELL CARCINOMA OF SKIN OF NOSE: Status: ACTIVE | Noted: 2021-03-25

## 2021-03-25 PROCEDURE — OTHER FOLLOW UP FOR NEXT VISIT: OTHER

## 2021-03-25 PROCEDURE — OTHER SUPERFICIAL RADIATION TREATMENT: OTHER

## 2021-03-25 PROCEDURE — OTHER TREATMENT REGIMEN: OTHER

## 2021-03-25 PROCEDURE — G6001 ECHO GUIDANCE RADIOTHERAPY: HCPCS

## 2021-03-25 PROCEDURE — 77401 RADIATION TX DELIVERY SUPFC: CPT

## 2021-03-25 PROCEDURE — 77280 THER RAD SIMULAJ FIELD SMPL: CPT

## 2021-03-25 NOTE — PROCEDURE: TREATMENT REGIMEN
Detail Level: Detailed
Plan: This patient has been treated today with image guided superficial radiation therapy for non-melanoma skin cancer.\\n\\nWritten informed consent has been previously obtained from this patient for this treatment. This consent is documented in the patient’s chart. The patient gave verbal consent to continue treatment today.\\n\\nThe patient was treated with a specific radiation dose and setup as prescribed by the provider listed on this visit note. A Radiation Therapist performed administration of radiation under supervision of provider. The treatment parameters and cumulative dose are indicated above.\\n\\nPrior to administering the radiation, the patient underwent a verification therapeutic radiology simulation-aided field setting defining relevant normal and abnormal target anatomy and acquiring images with high frequency ultrasound in addition to data necessary developing optimal radiation treatment process for the patient. This process includes verification of the treatment port(s) and proper treatment positioning. All treatment ports were photographed within electronic medical record. The patient’s customized lead blocking along with gross tumor volume and margin was confirmed. Considering superficial radiotherapy is clinical in setup, this requires physician and radiation therapist to clarify location interest being treated against initial images, pathology and patient anatomy. Care was taken ensuring fields treated were geometrically accurate and properly positioned using therapeutic radiology simulation-aided field setting verification per fraction. This process is also utilized to determine if any prescription or setup changes are necessary. These steps are therefore medically necessary ensuring safe and effective administration of radiation. Ongoing therapeutic radiology simulation-aided field setting verification is ordered throughout course of therapy.\\n\\nA high frequency ultrasound image was acquired today for two-dimensional evaluation of the tumor volume and response to treatment, in addition to geometric accuracy of field placement. US depth is 2.45 mm, which is 0.15 mm in difference from previous imaging. The field placement and ultrasound imaging, per fraction, is separate and distinct from the initial simulation, and is an important task in providing safe administration of superficial radiation therapy. Physician evaluation of the ultrasound tumor depth will be ongoing throughout the course of treatment and is deemed medically necessary in order to ensure the efficacy of treatment and any necessary changes. Today’s image was evaluated for determination of continuation of treatment with the current plan or with necessary changes as appropriate. According to provider review of verification, therapeutic radiology simulation-aided field setting and imaging, no change is required. Additionally the use of ultrasound visualization and targeted assessment allows the patient to be able to see their cancer(s) progress, encouraging patient to complete and maintain compliance through full course of radiotherapy.\\n\\nPer Dr. Cano, continued ultrasound guidance and therapeutic radiology simulation-aided field setting verification per fraction is required for field placement, measurement of tumor depth, progress and acute effect monitoring.

## 2021-03-25 NOTE — PROCEDURE: SUPERFICIAL RADIATION TREATMENT
Field Number: 1
Information: Selecting Yes will display possible errors in your note based on the variables you have selected. This validation is only offered as a suggestion for you. PLEASE NOTE THAT THE VALIDATION TEXT WILL BE REMOVED WHEN YOU FINALIZE YOUR NOTE. IF YOU WANT TO FAX A PRELIMINARY NOTE YOU WILL NEED TO TOGGLE THIS TO 'NO' IF YOU DO NOT WANT IT IN YOUR FAXED NOTE.
Port Dimensions-Y Axis In Cm: 0
Time Dose Fractionation (Optional- Include Units If Applicable) Rx 2: 49
Treatment Device Design After Initial Simulation Justification (Will Render If Bill For Treatment Devices = Yes): The patient is status post radiation simulation and is evaluated as to the use of additional devices for shielding and placement for radiation therapy.
Include Rx 2 When Rendering Additional Prescriptions: No
Energy (Optional-Please Include Units): 100 kV
Time Dose Fractionation (Optional- Include Units If Applicable): 99
Simple Simulation Preamble Text Will Be Included With Simple Simulations (.......... Indications): Simple simulation was performed today for the following reasons:
Bill For Radiation Treatment: Yes
Total Number Of Fractions: 20
Patient Positioning: Supine
Depth (Optional-Please Include Units): 2.02 mm
Custom Shielding Afterword Text Will Not Be Included With Simple Simulations (X X Y Cm............): port to correlate with the lesion size, including treatment margin. The custom lead shield is adequate to accommodate the appropriate applicator and provide adequate shielding around the treatment site. Additional shielding (as noted below) is used to protect sensitive, normal tissues.
Comments: RTOG 2
Cumulative Dose In Cgy (Optional): 5277.63
Additional Prescription Justification Text: If there is any interruption in treatment exceeding 5 days please see Decay and Dose Adjustment Calculation and complete treatment under Prescription 2, 3 or 4 as appropriate.
Treatment Margins In Cm: 0.6
Shielding Size (Optional- Include Units) Rx 2: 2.2 x 3.3 cm
Computed Treatment Time In Min (Will Render The Same As Calculated Treatment Time If Left Blank): 0.47
Total Number Of Fractions Rx 2: 10
Field Size (Applicator): 2.0 cm
Dimensions-X Axis In Cm: 0.8
Daily Fractionated Dose (Optional- Include Units) Rx 2: 274.26 cGy
Fractionation Number: 19
Total Number Of Fractions Rx 4: 15
Daily Fractionated Dose (Optional- Include Units): 277.77 cGy
Dose Per Fractionation In Cgy (Optional): 277.77
Total Dose (Optional-Please Include Units): 5555.40 cGy
Simple Simulation Afterword Text Will Be Included With Simple Simulations (Indications............): The patient had a complete consultation regarding all applicable modalities for the treatment of their skin cancer and based on a variety of factors including the type of tumor, size, and location, the relevant medical history as well as local tissue factors, the functional status of the individual, the ability to perform necessary postoperative wound instructions and the need for simultaneous treatments as well as overall wound healing status, it was determined that the patient would begin radiation therapy treatment for skin cancer.  A full simulation and treatment device design was performed including the determination and formulation of appropriate simple and complex devices including lead shield of 0.762 mm thickness to form molded customized shielding to specifically correlate with the lesion size including treatment margin.  The custom lead shield is adequate to accommodate the appropriate applicator and provide adequate shielding around the treatment site.  The specific field applicator, shields, and devices both simple and complex as well as the specific patient setup is outlined below.  The patient was given a full consent for superficial radiation to both verbally and in writing and the full determination of patient's eligibility for treatment and selection is outlined on the patient eligibility and treatment selection form.  The specific superficial radiotherapy prescription was determined and was documented on the superficial radiotherapy prescription form.  A treatment calculation was also performed and documented on the treatment calculation form.  Based on the prescription, the patient was scheduled for a series of fractional treatments.
Field Size (Applicator) Rx 2: 4.0 cm
Fractions / Week Rx 2: 3
Assessment: Appropriate reaction
Intro Statement (Will Not Render If Left Blank): The patient is undergoing superficial radiation therapy for skin cancer and presents for weekly evaluation and management. Per protocol and as documented on the flow sheet, the patient was questioned as to subjective redness, pruritus, pain, drainage, fatigue, or any other symptoms. Objectively, the radiation area was evaluated with regards to erythema, atrophy, scale, crusting, erosion, ulceration, edema, purpura, tenderness, warmth, drainage, and any other findings. The plan was extensively reviewed including the dose, and dosing schedule. The simulation and clinical setup was also reviewed as was the external and any internal shields and based on this review the appropriateness and sufficiency of treatment was determined.
Day Of The Week Treatment Administered: Thursday
Fractions / Week Rx 3: 5
Total Dose (Optional-Please Include Units) Rx 2: 2742.60 cGy
Energy (Optional-Please Include Units) Rx 2: 70 kV
Functional Status: 0 (fully active)
Dimensions-Y Axis In Cm: 0.7
Treatment Time / Fractionation (Optional- Include Units) Rx 2: 0.42 min
Treatment Time / Fractionation (Optional- Include Units): 0.47 min
Custom Shielding Preamble Text Will Not Be Included With Simple Simulations (.......... X X Y Cm): A lead shield of 0.762 mm thickness is utilized to form a molded, custom shield with a
Detail Level: Detailed

## 2021-03-26 ENCOUNTER — APPOINTMENT (OUTPATIENT)
Dept: URBAN - METROPOLITAN AREA CLINIC 289 | Age: 66
Setting detail: DERMATOLOGY
End: 2021-03-30

## 2021-03-26 PROBLEM — C44.311 BASAL CELL CARCINOMA OF SKIN OF NOSE: Status: ACTIVE | Noted: 2021-03-26

## 2021-03-26 PROCEDURE — OTHER SUPERFICIAL RADIATION TREATMENT: OTHER

## 2021-03-26 PROCEDURE — G6001 ECHO GUIDANCE RADIOTHERAPY: HCPCS

## 2021-03-26 PROCEDURE — OTHER FOLLOW UP FOR NEXT VISIT: OTHER

## 2021-03-26 PROCEDURE — OTHER TREATMENT REGIMEN: OTHER

## 2021-03-26 PROCEDURE — 77280 THER RAD SIMULAJ FIELD SMPL: CPT

## 2021-03-26 PROCEDURE — 77401 RADIATION TX DELIVERY SUPFC: CPT

## 2021-03-26 NOTE — PROCEDURE: SUPERFICIAL RADIATION TREATMENT
Field Size (Applicator): 2.0 cm
Daily Fractionated Dose (Optional- Include Units) Rx 2: 274.26 cGy
Bill For Dosimetry/Render Treatment Time Calculation In Note: No
Cumulative Dose In Cgy (Optional): 5555.40
Depth (Optional-Please Include Units): 2.02 mm
Fractionation Number: 20
Treatment Margins In Cm: 0.6
Comments: RTOG 2
Assessment: Appropriate reaction
Fractions / Week Rx 2: 3
Port Dimensions-X Axis In Cm: 0
Treatment Time In Min (Optional): 0.47
Prescription Used: 1
Additional Prescription Justification Text: If there is any interruption in treatment exceeding 5 days please see Decay and Dose Adjustment Calculation and complete treatment under Prescription 2, 3 or 4 as appropriate.
Information: Selecting Yes will display possible errors in your note based on the variables you have selected. This validation is only offered as a suggestion for you. PLEASE NOTE THAT THE VALIDATION TEXT WILL BE REMOVED WHEN YOU FINALIZE YOUR NOTE. IF YOU WANT TO FAX A PRELIMINARY NOTE YOU WILL NEED TO TOGGLE THIS TO 'NO' IF YOU DO NOT WANT IT IN YOUR FAXED NOTE.
Custom Shielding Afterword Text Will Not Be Included With Simple Simulations (X X Y Cm............): port to correlate with the lesion size, including treatment margin. The custom lead shield is adequate to accommodate the appropriate applicator and provide adequate shielding around the treatment site. Additional shielding (as noted below) is used to protect sensitive, normal tissues.
Simple Simulation Afterword Text Will Be Included With Simple Simulations (Indications............): The patient had a complete consultation regarding all applicable modalities for the treatment of their skin cancer and based on a variety of factors including the type of tumor, size, and location, the relevant medical history as well as local tissue factors, the functional status of the individual, the ability to perform necessary postoperative wound instructions and the need for simultaneous treatments as well as overall wound healing status, it was determined that the patient would begin radiation therapy treatment for skin cancer.  A full simulation and treatment device design was performed including the determination and formulation of appropriate simple and complex devices including lead shield of 0.762 mm thickness to form molded customized shielding to specifically correlate with the lesion size including treatment margin.  The custom lead shield is adequate to accommodate the appropriate applicator and provide adequate shielding around the treatment site.  The specific field applicator, shields, and devices both simple and complex as well as the specific patient setup is outlined below.  The patient was given a full consent for superficial radiation to both verbally and in writing and the full determination of patient's eligibility for treatment and selection is outlined on the patient eligibility and treatment selection form.  The specific superficial radiotherapy prescription was determined and was documented on the superficial radiotherapy prescription form.  A treatment calculation was also performed and documented on the treatment calculation form.  Based on the prescription, the patient was scheduled for a series of fractional treatments.
Energy (Include Units): 100 kV
Dimensions-X Axis In Cm: 0.8
Dose Per Fractionation In Cgy (Optional): 277.77
Intro Statement (Will Not Render If Left Blank): The patient is undergoing superficial radiation therapy for skin cancer and presents for weekly evaluation and management. Per protocol and as documented on the flow sheet, the patient was questioned as to subjective redness, pruritus, pain, drainage, fatigue, or any other symptoms. Objectively, the radiation area was evaluated with regards to erythema, atrophy, scale, crusting, erosion, ulceration, edema, purpura, tenderness, warmth, drainage, and any other findings. The plan was extensively reviewed including the dose, and dosing schedule. The simulation and clinical setup was also reviewed as was the external and any internal shields and based on this review the appropriateness and sufficiency of treatment was determined.
Energy (Optional-Please Include Units) Rx 2: 70 kV
Daily Fractionated Dose (Optional- Include Units): 277.77 cGy
Fractionation Number (Evaluation): 10
Functional Status: 0 (fully active)
Treatment Time / Fractionation (Optional- Include Units) Rx 2: 0.42 min
Total Number Of Fractions Rx 4: 15
Time Dose Fractionation (Optional- Include Units If Applicable) Rx 2: 49
Fractions / Week Rx 3: 5
Time Dose Fractionation (Optional- Include Units If Applicable): 99
Bill For Radiation Treatment: Yes
Day Of The Week Treatment Administered: Friday
Treatment Device Design After Initial Simulation Justification (Will Render If Bill For Treatment Devices = Yes): The patient is status post radiation simulation and is evaluated as to the use of additional devices for shielding and placement for radiation therapy.
Detail Level: Detailed
Total Dose (Optional-Please Include Units) Rx 2: 2742.60 cGy
Dimensions-Y Axis In Cm: 0.7
Total Dose (Optional-Please Include Units): 5555.40 cGy
Field Size (Applicator) Rx 2: 4.0 cm
Custom Shielding Preamble Text Will Not Be Included With Simple Simulations (.......... X X Y Cm): A lead shield of 0.762 mm thickness is utilized to form a molded, custom shield with a
Treatment Time / Fractionation (Optional- Include Units): 0.47 min
Simple Simulation Preamble Text Will Be Included With Simple Simulations (.......... Indications): Simple simulation was performed today for the following reasons:
Patient Positioning: Supine
Shielding Size (Optional- Include Units) Rx 2: 2.2 x 3.3 cm

## 2021-03-26 NOTE — PROCEDURE: TREATMENT REGIMEN
Detail Level: Detailed
Plan: This patient has been treated today with image guided superficial radiation therapy for non-melanoma skin cancer.\\n\\nWritten informed consent has been previously obtained from this patient for this treatment. This consent is documented in the patient’s chart. The patient gave verbal consent to continue treatment today.\\n\\nThe patient was treated with a specific radiation dose and setup as prescribed by the provider listed on this visit note. A Radiation Therapist performed administration of radiation under supervision of provider. The treatment parameters and cumulative dose are indicated above.\\n\\nPrior to administering the radiation, the patient underwent a verification therapeutic radiology simulation-aided field setting defining relevant normal and abnormal target anatomy and acquiring images with high frequency ultrasound in addition to data necessary developing optimal radiation treatment process for the patient. This process includes verification of the treatment port(s) and proper treatment positioning. All treatment ports were photographed within electronic medical record. The patient’s customized lead blocking along with gross tumor volume and margin was confirmed. Considering superficial radiotherapy is clinical in setup, this requires physician and radiation therapist to clarify location interest being treated against initial images, pathology and patient anatomy. Care was taken ensuring fields treated were geometrically accurate and properly positioned using therapeutic radiology simulation-aided field setting verification per fraction. This process is also utilized to determine if any prescription or setup changes are necessary. These steps are therefore medically necessary ensuring safe and effective administration of radiation. Ongoing therapeutic radiology simulation-aided field setting verification is ordered throughout course of therapy.\\n\\nA high frequency ultrasound image was acquired today for two-dimensional evaluation of the tumor volume and response to treatment, in addition to geometric accuracy of field placement. US depth is 2.27 mm, which is 0.18 mm in difference from previous imaging. The field placement and ultrasound imaging, per fraction, is separate and distinct from the initial simulation, and is an important task in providing safe administration of superficial radiation therapy. Physician evaluation of the ultrasound tumor depth will be ongoing throughout the course of treatment and is deemed medically necessary in order to ensure the efficacy of treatment and any necessary changes. Today’s image was evaluated for determination of continuation of treatment with the current plan or with necessary changes as appropriate. According to provider review of verification, therapeutic radiology simulation-aided field setting and imaging, no change is required. Additionally the use of ultrasound visualization and targeted assessment allows the patient to be able to see their cancer(s) progress, encouraging patient to complete and maintain compliance through full course of radiotherapy.\\n\\nPer Dr. Cano, continued ultrasound guidance and therapeutic radiology simulation-aided field setting verification per fraction is required for field placement, measurement of tumor depth, progress and acute effect monitoring.

## 2021-05-11 ENCOUNTER — APPOINTMENT (OUTPATIENT)
Dept: URBAN - METROPOLITAN AREA CLINIC 232 | Age: 66
Setting detail: DERMATOLOGY
End: 2021-05-11

## 2021-05-11 PROBLEM — C44.311 BASAL CELL CARCINOMA OF SKIN OF NOSE: Status: ACTIVE | Noted: 2021-05-11

## 2021-05-11 PROCEDURE — OTHER PATIENT SPECIFIC COUNSELING: OTHER

## 2021-05-11 PROCEDURE — OTHER COUNSELING: OTHER

## 2021-05-11 PROCEDURE — 99212 OFFICE O/P EST SF 10 MIN: CPT

## 2021-05-11 NOTE — PROCEDURE: PATIENT SPECIFIC COUNSELING
The patient has completed 7 weeks of SRT. Reassured the patient that this site is well healed with no signs of recurrence. We will continue to monitor at follow up visits.
Detail Level: Zone

## 2021-12-10 ENCOUNTER — APPOINTMENT (OUTPATIENT)
Dept: URBAN - METROPOLITAN AREA CLINIC 232 | Age: 66
Setting detail: DERMATOLOGY
End: 2021-12-10

## 2021-12-10 DIAGNOSIS — L81.4 OTHER MELANIN HYPERPIGMENTATION: ICD-10-CM

## 2021-12-10 DIAGNOSIS — D22 MELANOCYTIC NEVI: ICD-10-CM

## 2021-12-10 DIAGNOSIS — D89.811 CHRONIC GRAFT-VERSUS-HOST DISEASE: ICD-10-CM

## 2021-12-10 DIAGNOSIS — L82.1 OTHER SEBORRHEIC KERATOSIS: ICD-10-CM

## 2021-12-10 DIAGNOSIS — D18.0 HEMANGIOMA: ICD-10-CM

## 2021-12-10 PROBLEM — D22.5 MELANOCYTIC NEVI OF TRUNK: Status: ACTIVE | Noted: 2021-12-10

## 2021-12-10 PROBLEM — D18.01 HEMANGIOMA OF SKIN AND SUBCUTANEOUS TISSUE: Status: ACTIVE | Noted: 2021-12-10

## 2021-12-10 PROCEDURE — OTHER COUNSELING: OTHER

## 2021-12-10 PROCEDURE — 99213 OFFICE O/P EST LOW 20 MIN: CPT

## 2021-12-10 PROCEDURE — OTHER MIPS QUALITY: OTHER

## 2021-12-10 ASSESSMENT — LOCATION SIMPLE DESCRIPTION DERM
LOCATION SIMPLE: LEFT UPPER BACK
LOCATION SIMPLE: RIGHT UPPER BACK
LOCATION SIMPLE: LEFT LOWER BACK

## 2021-12-10 ASSESSMENT — LOCATION ZONE DERM: LOCATION ZONE: TRUNK

## 2021-12-10 ASSESSMENT — LOCATION DETAILED DESCRIPTION DERM
LOCATION DETAILED: LEFT SUPERIOR MEDIAL MIDBACK
LOCATION DETAILED: LEFT INFERIOR UPPER BACK
LOCATION DETAILED: LEFT MEDIAL UPPER BACK
LOCATION DETAILED: RIGHT MEDIAL UPPER BACK
LOCATION DETAILED: LEFT SUPERIOR UPPER BACK

## 2022-06-13 ENCOUNTER — APPOINTMENT (OUTPATIENT)
Dept: URBAN - METROPOLITAN AREA CLINIC 232 | Age: 67
Setting detail: DERMATOLOGY
End: 2022-06-13

## 2022-06-13 DIAGNOSIS — D22 MELANOCYTIC NEVI: ICD-10-CM

## 2022-06-13 DIAGNOSIS — L81.4 OTHER MELANIN HYPERPIGMENTATION: ICD-10-CM

## 2022-06-13 DIAGNOSIS — L57.0 ACTINIC KERATOSIS: ICD-10-CM

## 2022-06-13 DIAGNOSIS — L82.1 OTHER SEBORRHEIC KERATOSIS: ICD-10-CM

## 2022-06-13 DIAGNOSIS — D18.0 HEMANGIOMA: ICD-10-CM

## 2022-06-13 DIAGNOSIS — L57.8 OTHER SKIN CHANGES DUE TO CHRONIC EXPOSURE TO NONIONIZING RADIATION: ICD-10-CM

## 2022-06-13 PROBLEM — D18.01 HEMANGIOMA OF SKIN AND SUBCUTANEOUS TISSUE: Status: ACTIVE | Noted: 2022-06-13

## 2022-06-13 PROBLEM — D22.5 MELANOCYTIC NEVI OF TRUNK: Status: ACTIVE | Noted: 2022-06-13

## 2022-06-13 PROCEDURE — OTHER COUNSELING: OTHER

## 2022-06-13 PROCEDURE — OTHER LIQUID NITROGEN: OTHER

## 2022-06-13 PROCEDURE — 17003 DESTRUCT PREMALG LES 2-14: CPT

## 2022-06-13 PROCEDURE — OTHER ADDITIONAL NOTES: OTHER

## 2022-06-13 PROCEDURE — 17000 DESTRUCT PREMALG LESION: CPT

## 2022-06-13 PROCEDURE — 99213 OFFICE O/P EST LOW 20 MIN: CPT | Mod: 25

## 2022-06-13 PROCEDURE — OTHER MIPS QUALITY: OTHER

## 2022-06-13 PROCEDURE — OTHER SUNSCREEN RECOMMENDATIONS: OTHER

## 2022-06-13 ASSESSMENT — LOCATION ZONE DERM
LOCATION ZONE: TRUNK
LOCATION ZONE: EAR
LOCATION ZONE: FACE

## 2022-06-13 ASSESSMENT — LOCATION SIMPLE DESCRIPTION DERM
LOCATION SIMPLE: LEFT CHEEK
LOCATION SIMPLE: RIGHT CHEEK
LOCATION SIMPLE: LEFT EAR
LOCATION SIMPLE: LEFT UPPER BACK
LOCATION SIMPLE: LEFT FOREHEAD
LOCATION SIMPLE: RIGHT UPPER BACK

## 2022-06-13 ASSESSMENT — LOCATION DETAILED DESCRIPTION DERM
LOCATION DETAILED: LEFT SUPERIOR FOREHEAD
LOCATION DETAILED: RIGHT SUPERIOR CENTRAL MALAR CHEEK
LOCATION DETAILED: RIGHT INFERIOR MEDIAL UPPER BACK
LOCATION DETAILED: LEFT INFERIOR CENTRAL MALAR CHEEK
LOCATION DETAILED: LEFT INFERIOR UPPER BACK
LOCATION DETAILED: LEFT SUPERIOR CRUS OF ANTIHELIX
LOCATION DETAILED: LEFT MID-UPPER BACK
LOCATION DETAILED: RIGHT MID-UPPER BACK
LOCATION DETAILED: LEFT CENTRAL MALAR CHEEK

## 2022-06-13 NOTE — PROCEDURE: LIQUID NITROGEN
HER[facility]/rehabilitation facility
Render Post-Care Instructions In Note?: no
Show Aperture Variable?: Yes
Consent: The patient's consent was obtained including but not limited to risks of crusting, scabbing, blistering, scarring, darker or lighter pigmentary change, recurrence, incomplete removal and infection.
Aperture Size (Optional): C
Post-Care Instructions: I reviewed with the patient in detail post-care instructions. Patient is to wear sunprotection, and avoid picking at any of the treated lesions. Pt may apply Vaseline to crusted or scabbing areas.
Duration Of Freeze Thaw-Cycle (Seconds): 5
Detail Level: Detailed
Number Of Freeze-Thaw Cycles: 2 freeze-thaw cycles

## 2022-06-13 NOTE — PROCEDURE: ADDITIONAL NOTES
Detail Level: Simple
Render Risk Assessment In Note?: no
Additional Notes: Will discuss treatment with 5 fu at next TBSE

## 2022-12-13 ENCOUNTER — APPOINTMENT (OUTPATIENT)
Dept: URBAN - METROPOLITAN AREA CLINIC 232 | Age: 67
Setting detail: DERMATOLOGY
End: 2022-12-13

## 2022-12-13 DIAGNOSIS — L57.0 ACTINIC KERATOSIS: ICD-10-CM

## 2022-12-13 DIAGNOSIS — D18.0 HEMANGIOMA: ICD-10-CM

## 2022-12-13 DIAGNOSIS — L81.4 OTHER MELANIN HYPERPIGMENTATION: ICD-10-CM

## 2022-12-13 DIAGNOSIS — L82.1 OTHER SEBORRHEIC KERATOSIS: ICD-10-CM

## 2022-12-13 DIAGNOSIS — D22 MELANOCYTIC NEVI: ICD-10-CM

## 2022-12-13 DIAGNOSIS — L57.8 OTHER SKIN CHANGES DUE TO CHRONIC EXPOSURE TO NONIONIZING RADIATION: ICD-10-CM

## 2022-12-13 PROBLEM — D22.5 MELANOCYTIC NEVI OF TRUNK: Status: ACTIVE | Noted: 2022-12-13

## 2022-12-13 PROBLEM — D18.01 HEMANGIOMA OF SKIN AND SUBCUTANEOUS TISSUE: Status: ACTIVE | Noted: 2022-12-13

## 2022-12-13 PROCEDURE — OTHER COUNSELING: OTHER

## 2022-12-13 PROCEDURE — OTHER MIPS QUALITY: OTHER

## 2022-12-13 PROCEDURE — OTHER LIQUID NITROGEN: OTHER

## 2022-12-13 PROCEDURE — 17003 DESTRUCT PREMALG LES 2-14: CPT

## 2022-12-13 PROCEDURE — 99213 OFFICE O/P EST LOW 20 MIN: CPT | Mod: 25

## 2022-12-13 PROCEDURE — 17000 DESTRUCT PREMALG LESION: CPT

## 2022-12-13 ASSESSMENT — LOCATION SIMPLE DESCRIPTION DERM
LOCATION SIMPLE: RIGHT OCCIPITAL SCALP
LOCATION SIMPLE: RIGHT EAR
LOCATION SIMPLE: LEFT CHEEK
LOCATION SIMPLE: LEFT FOREHEAD
LOCATION SIMPLE: UPPER BACK
LOCATION SIMPLE: SCALP
LOCATION SIMPLE: LEFT UPPER BACK

## 2022-12-13 ASSESSMENT — LOCATION DETAILED DESCRIPTION DERM
LOCATION DETAILED: RIGHT ANTITRAGUS
LOCATION DETAILED: LEFT SUPERIOR PARIETAL SCALP
LOCATION DETAILED: LEFT SUPERIOR FOREHEAD
LOCATION DETAILED: RIGHT CYMBA CONCHA
LOCATION DETAILED: INFERIOR THORACIC SPINE
LOCATION DETAILED: LEFT MID-UPPER BACK
LOCATION DETAILED: LEFT CENTRAL MALAR CHEEK
LOCATION DETAILED: LEFT INFERIOR UPPER BACK
LOCATION DETAILED: RIGHT SUPERIOR OCCIPITAL SCALP

## 2022-12-13 ASSESSMENT — LOCATION ZONE DERM
LOCATION ZONE: FACE
LOCATION ZONE: TRUNK
LOCATION ZONE: EAR
LOCATION ZONE: SCALP

## 2022-12-13 NOTE — PROCEDURE: LIQUID NITROGEN
Render Note In Bullet Format When Appropriate: No
Detail Level: Detailed
Aperture Size (Optional): C
Duration Of Freeze Thaw-Cycle (Seconds): 5
Post-Care Instructions: I reviewed with the patient in detail post-care instructions. Patient is to wear sunprotection, and avoid picking at any of the treated lesions. Pt may apply Vaseline to crusted or scabbing areas.
Number Of Freeze-Thaw Cycles: 2 freeze-thaw cycles
Show Applicator Variable?: Yes
Consent: The patient's consent was obtained including but not limited to risks of crusting, scabbing, blistering, scarring, darker or lighter pigmentary change, recurrence, incomplete removal and infection.

## 2023-06-13 ENCOUNTER — APPOINTMENT (OUTPATIENT)
Dept: URBAN - METROPOLITAN AREA CLINIC 232 | Age: 68
Setting detail: DERMATOLOGY
End: 2023-06-13

## 2023-06-13 DIAGNOSIS — D18.0 HEMANGIOMA: ICD-10-CM

## 2023-06-13 DIAGNOSIS — D22 MELANOCYTIC NEVI: ICD-10-CM

## 2023-06-13 DIAGNOSIS — L57.8 OTHER SKIN CHANGES DUE TO CHRONIC EXPOSURE TO NONIONIZING RADIATION: ICD-10-CM

## 2023-06-13 DIAGNOSIS — L81.4 OTHER MELANIN HYPERPIGMENTATION: ICD-10-CM

## 2023-06-13 DIAGNOSIS — L82.1 OTHER SEBORRHEIC KERATOSIS: ICD-10-CM

## 2023-06-13 DIAGNOSIS — L57.0 ACTINIC KERATOSIS: ICD-10-CM

## 2023-06-13 DIAGNOSIS — L30.8 OTHER SPECIFIED DERMATITIS: ICD-10-CM

## 2023-06-13 PROBLEM — D18.01 HEMANGIOMA OF SKIN AND SUBCUTANEOUS TISSUE: Status: ACTIVE | Noted: 2023-06-13

## 2023-06-13 PROBLEM — D22.5 MELANOCYTIC NEVI OF TRUNK: Status: ACTIVE | Noted: 2023-06-13

## 2023-06-13 PROCEDURE — OTHER LIQUID NITROGEN: OTHER

## 2023-06-13 PROCEDURE — OTHER SUNSCREEN RECOMMENDATIONS: OTHER

## 2023-06-13 PROCEDURE — 99213 OFFICE O/P EST LOW 20 MIN: CPT | Mod: 25

## 2023-06-13 PROCEDURE — 17000 DESTRUCT PREMALG LESION: CPT

## 2023-06-13 PROCEDURE — OTHER PRESCRIPTION: OTHER

## 2023-06-13 PROCEDURE — OTHER COUNSELING: OTHER

## 2023-06-13 RX ORDER — TRIAMCINOLONE ACETONIDE 1 MG/G
CREAM TOPICAL
Qty: 454 | Refills: 1 | Status: ERX | COMMUNITY
Start: 2023-06-13

## 2023-06-13 ASSESSMENT — LOCATION ZONE DERM
LOCATION ZONE: LEG
LOCATION ZONE: ARM
LOCATION ZONE: FACE
LOCATION ZONE: TRUNK

## 2023-06-13 ASSESSMENT — LOCATION SIMPLE DESCRIPTION DERM
LOCATION SIMPLE: LEFT CALF
LOCATION SIMPLE: UPPER BACK
LOCATION SIMPLE: LEFT UPPER BACK
LOCATION SIMPLE: RIGHT FOREARM
LOCATION SIMPLE: LEFT CHEEK
LOCATION SIMPLE: RIGHT UPPER BACK

## 2023-06-13 ASSESSMENT — LOCATION DETAILED DESCRIPTION DERM
LOCATION DETAILED: RIGHT MEDIAL UPPER BACK
LOCATION DETAILED: RIGHT PROXIMAL DORSAL FOREARM
LOCATION DETAILED: LEFT SUPERIOR MEDIAL UPPER BACK
LOCATION DETAILED: LEFT DISTAL CALF
LOCATION DETAILED: LEFT INFERIOR CENTRAL MALAR CHEEK
LOCATION DETAILED: LEFT CENTRAL MALAR CHEEK
LOCATION DETAILED: LEFT MEDIAL UPPER BACK
LOCATION DETAILED: SUPERIOR THORACIC SPINE

## 2023-06-13 NOTE — PROCEDURE: LIQUID NITROGEN
Render Note In Bullet Format When Appropriate: No
Post-Care Instructions: I reviewed with the patient in detail post-care instructions. Patient is to wear sunprotection, and avoid picking at any of the treated lesions. Pt may apply Vaseline to crusted or scabbing areas.
Show Applicator Variable?: Yes
Application Tool (Optional): Liquid Nitrogen Sprayer
Duration Of Freeze Thaw-Cycle (Seconds): 5
Number Of Freeze-Thaw Cycles: 3 freeze-thaw cycles
Detail Level: Detailed
Consent: The patient's consent was obtained including but not limited to risks of crusting, scabbing, blistering, scarring, darker or lighter pigmentary change, recurrence, incomplete removal and infection.
Aperture Size (Optional): C

## 2023-06-13 NOTE — HPI: FULL BODY SKIN EXAMINATION
What Type Of Note Output Would You Prefer (Optional)?: Standard Output
What Is The Reason For Today's Visit?: Full Body Skin Examination
What Is The Reason For Today's Visit? (Being Monitored For X): concerning skin lesions on a periodic basis
Additional History: Spot on left cheek that comes and goes, asymptomatic, present for years.

## 2023-12-13 ENCOUNTER — APPOINTMENT (OUTPATIENT)
Dept: URBAN - METROPOLITAN AREA CLINIC 232 | Age: 68
Setting detail: DERMATOLOGY
End: 2023-12-13

## 2023-12-13 DIAGNOSIS — L57.8 OTHER SKIN CHANGES DUE TO CHRONIC EXPOSURE TO NONIONIZING RADIATION: ICD-10-CM

## 2023-12-13 DIAGNOSIS — D18.0 HEMANGIOMA: ICD-10-CM

## 2023-12-13 DIAGNOSIS — D22 MELANOCYTIC NEVI: ICD-10-CM

## 2023-12-13 DIAGNOSIS — L57.0 ACTINIC KERATOSIS: ICD-10-CM

## 2023-12-13 DIAGNOSIS — L82.1 OTHER SEBORRHEIC KERATOSIS: ICD-10-CM

## 2023-12-13 DIAGNOSIS — L81.4 OTHER MELANIN HYPERPIGMENTATION: ICD-10-CM

## 2023-12-13 PROBLEM — D22.5 MELANOCYTIC NEVI OF TRUNK: Status: ACTIVE | Noted: 2023-12-13

## 2023-12-13 PROBLEM — D18.01 HEMANGIOMA OF SKIN AND SUBCUTANEOUS TISSUE: Status: ACTIVE | Noted: 2023-12-13

## 2023-12-13 PROCEDURE — OTHER LIQUID NITROGEN: OTHER

## 2023-12-13 PROCEDURE — OTHER SUNSCREEN RECOMMENDATIONS: OTHER

## 2023-12-13 PROCEDURE — 17000 DESTRUCT PREMALG LESION: CPT

## 2023-12-13 PROCEDURE — 99213 OFFICE O/P EST LOW 20 MIN: CPT | Mod: 25

## 2023-12-13 PROCEDURE — OTHER MIPS QUALITY: OTHER

## 2023-12-13 PROCEDURE — 17003 DESTRUCT PREMALG LES 2-14: CPT

## 2023-12-13 PROCEDURE — OTHER COUNSELING: OTHER

## 2023-12-13 ASSESSMENT — LOCATION SIMPLE DESCRIPTION DERM
LOCATION SIMPLE: LEFT CHEEK
LOCATION SIMPLE: RIGHT FOREARM
LOCATION SIMPLE: SCALP
LOCATION SIMPLE: POSTERIOR SCALP
LOCATION SIMPLE: LEFT UPPER BACK
LOCATION SIMPLE: RIGHT UPPER BACK
LOCATION SIMPLE: LEFT FOREHEAD

## 2023-12-13 ASSESSMENT — LOCATION ZONE DERM
LOCATION ZONE: TRUNK
LOCATION ZONE: SCALP
LOCATION ZONE: FACE
LOCATION ZONE: ARM

## 2023-12-13 ASSESSMENT — LOCATION DETAILED DESCRIPTION DERM
LOCATION DETAILED: POSTERIOR MID-PARIETAL SCALP
LOCATION DETAILED: RIGHT SUPERIOR PARIETAL SCALP
LOCATION DETAILED: LEFT SUPERIOR FOREHEAD
LOCATION DETAILED: LEFT INFERIOR CENTRAL MALAR CHEEK
LOCATION DETAILED: LEFT MEDIAL UPPER BACK
LOCATION DETAILED: LEFT SUPERIOR MEDIAL UPPER BACK
LOCATION DETAILED: RIGHT MEDIAL UPPER BACK
LOCATION DETAILED: RIGHT PROXIMAL DORSAL FOREARM

## 2023-12-13 NOTE — PROCEDURE: LIQUID NITROGEN
Consent: The patient's consent was obtained including but not limited to risks of crusting, scabbing, blistering, scarring, darker or lighter pigmentary change, recurrence, incomplete removal and infection.
Show Aperture Variable?: Yes
Application Tool (Optional): Liquid Nitrogen Sprayer
Render Note In Bullet Format When Appropriate: No
Duration Of Freeze Thaw-Cycle (Seconds): 5
Post-Care Instructions: I reviewed with the patient in detail post-care instructions. Patient is to wear sunprotection, and avoid picking at any of the treated lesions. Pt may apply Vaseline to crusted or scabbing areas.
Aperture Size (Optional): C
Number Of Freeze-Thaw Cycles: 3 freeze-thaw cycles
Detail Level: Detailed

## 2024-06-25 ENCOUNTER — APPOINTMENT (OUTPATIENT)
Dept: URBAN - METROPOLITAN AREA CLINIC 232 | Age: 69
Setting detail: DERMATOLOGY
End: 2024-06-25

## 2024-06-25 DIAGNOSIS — L57.0 ACTINIC KERATOSIS: ICD-10-CM

## 2024-06-25 DIAGNOSIS — L81.4 OTHER MELANIN HYPERPIGMENTATION: ICD-10-CM

## 2024-06-25 DIAGNOSIS — D22 MELANOCYTIC NEVI: ICD-10-CM

## 2024-06-25 DIAGNOSIS — D18.0 HEMANGIOMA: ICD-10-CM

## 2024-06-25 DIAGNOSIS — L82.1 OTHER SEBORRHEIC KERATOSIS: ICD-10-CM

## 2024-06-25 DIAGNOSIS — L57.8 OTHER SKIN CHANGES DUE TO CHRONIC EXPOSURE TO NONIONIZING RADIATION: ICD-10-CM

## 2024-06-25 PROBLEM — D18.01 HEMANGIOMA OF SKIN AND SUBCUTANEOUS TISSUE: Status: ACTIVE | Noted: 2024-06-25

## 2024-06-25 PROBLEM — D22.5 MELANOCYTIC NEVI OF TRUNK: Status: ACTIVE | Noted: 2024-06-25

## 2024-06-25 PROCEDURE — 99213 OFFICE O/P EST LOW 20 MIN: CPT | Mod: 25

## 2024-06-25 PROCEDURE — OTHER SUNSCREEN RECOMMENDATIONS: OTHER

## 2024-06-25 PROCEDURE — OTHER MIPS QUALITY: OTHER

## 2024-06-25 PROCEDURE — OTHER COUNSELING: OTHER

## 2024-06-25 PROCEDURE — 17000 DESTRUCT PREMALG LESION: CPT

## 2024-06-25 PROCEDURE — OTHER LIQUID NITROGEN: OTHER

## 2024-06-25 ASSESSMENT — LOCATION ZONE DERM
LOCATION ZONE: TRUNK
LOCATION ZONE: ARM
LOCATION ZONE: FACE

## 2024-06-25 ASSESSMENT — LOCATION SIMPLE DESCRIPTION DERM
LOCATION SIMPLE: LEFT UPPER BACK
LOCATION SIMPLE: RIGHT UPPER BACK
LOCATION SIMPLE: RIGHT FOREARM
LOCATION SIMPLE: LEFT CHEEK

## 2024-06-25 ASSESSMENT — LOCATION DETAILED DESCRIPTION DERM
LOCATION DETAILED: RIGHT MEDIAL UPPER BACK
LOCATION DETAILED: RIGHT PROXIMAL DORSAL FOREARM
LOCATION DETAILED: LEFT INFERIOR CENTRAL MALAR CHEEK
LOCATION DETAILED: LEFT SUPERIOR MEDIAL UPPER BACK
LOCATION DETAILED: LEFT CENTRAL MALAR CHEEK
LOCATION DETAILED: LEFT MEDIAL UPPER BACK

## 2024-06-25 NOTE — PROCEDURE: LIQUID NITROGEN
sudden onset
Render Note In Bullet Format When Appropriate: No
Post-Care Instructions: I reviewed with the patient in detail post-care instructions. Patient is to wear sunprotection, and avoid picking at any of the treated lesions. Pt may apply Vaseline to crusted or scabbing areas.
Show Applicator Variable?: Yes
Application Tool (Optional): Liquid Nitrogen Sprayer
Duration Of Freeze Thaw-Cycle (Seconds): 5
Number Of Freeze-Thaw Cycles: 3 freeze-thaw cycles
Detail Level: Detailed
Consent: The patient's consent was obtained including but not limited to risks of crusting, scabbing, blistering, scarring, darker or lighter pigmentary change, recurrence, incomplete removal and infection.
Aperture Size (Optional): C